# Patient Record
Sex: FEMALE | Race: WHITE | Employment: FULL TIME | URBAN - METROPOLITAN AREA
[De-identification: names, ages, dates, MRNs, and addresses within clinical notes are randomized per-mention and may not be internally consistent; named-entity substitution may affect disease eponyms.]

---

## 2020-11-04 ENCOUNTER — HOSPITAL ENCOUNTER (OUTPATIENT)
Dept: LAB | Facility: MEDICAL CENTER | Age: 50
End: 2020-11-04
Payer: COMMERCIAL

## 2020-11-05 LAB
COVID ORDER STATUS COVID19: NORMAL
SARS-COV-2 RNA RESP QL NAA+PROBE: NOTDETECTED
SPECIMEN SOURCE: NORMAL

## 2020-11-24 ENCOUNTER — HOSPITAL ENCOUNTER (OUTPATIENT)
Dept: LAB | Facility: MEDICAL CENTER | Age: 50
End: 2020-11-24
Attending: PATHOLOGY
Payer: COMMERCIAL

## 2020-11-24 LAB — COVID ORDER STATUS COVID19: NORMAL

## 2020-11-25 LAB
SARS-COV-2 RNA RESP QL NAA+PROBE: NOTDETECTED
SPECIMEN SOURCE: NORMAL

## 2021-01-29 ENCOUNTER — HOSPITAL ENCOUNTER (OUTPATIENT)
Dept: LAB | Facility: MEDICAL CENTER | Age: 51
End: 2021-01-29
Payer: COMMERCIAL

## 2021-01-29 LAB
COVID ORDER STATUS COVID19: NORMAL
SARS-COV-2 RNA RESP QL NAA+PROBE: DETECTED
SPECIMEN SOURCE: ABNORMAL

## 2021-01-29 PROCEDURE — U0003 INFECTIOUS AGENT DETECTION BY NUCLEIC ACID (DNA OR RNA); SEVERE ACUTE RESPIRATORY SYNDROME CORONAVIRUS 2 (SARS-COV-2) (CORONAVIRUS DISEASE [COVID-19]), AMPLIFIED PROBE TECHNIQUE, MAKING USE OF HIGH THROUGHPUT TECHNOLOGIES AS DESCRIBED BY CMS-2020-01-R: HCPCS

## 2021-01-29 PROCEDURE — U0005 INFEC AGEN DETEC AMPLI PROBE: HCPCS

## 2021-07-20 ENCOUNTER — TELEPHONE (OUTPATIENT)
Dept: SCHEDULING | Facility: IMAGING CENTER | Age: 51
End: 2021-07-20

## 2021-08-31 ENCOUNTER — OFFICE VISIT (OUTPATIENT)
Dept: MEDICAL GROUP | Facility: PHYSICIAN GROUP | Age: 51
End: 2021-08-31
Payer: COMMERCIAL

## 2021-08-31 VITALS
TEMPERATURE: 97 F | HEART RATE: 89 BPM | WEIGHT: 259 LBS | BODY MASS INDEX: 43.15 KG/M2 | OXYGEN SATURATION: 95 % | HEIGHT: 65 IN | DIASTOLIC BLOOD PRESSURE: 82 MMHG | SYSTOLIC BLOOD PRESSURE: 128 MMHG

## 2021-08-31 DIAGNOSIS — E13.9 DIABETES MELLITUS TYPE 1.5, MANAGED AS TYPE 1 (HCC): ICD-10-CM

## 2021-08-31 DIAGNOSIS — F50.2 BULIMIA NERVOSA, PURGING TYPE: ICD-10-CM

## 2021-08-31 PROCEDURE — 99204 OFFICE O/P NEW MOD 45 MIN: CPT | Performed by: PHYSICIAN ASSISTANT

## 2021-08-31 RX ORDER — FLUOXETINE HYDROCHLORIDE 20 MG/1
20 CAPSULE ORAL DAILY
COMMUNITY
End: 2021-08-31

## 2021-08-31 RX ORDER — INSULIN GLARGINE 100 [IU]/ML
60 INJECTION, SOLUTION SUBCUTANEOUS EVERY EVENING
Qty: 60 ML | Refills: 2 | Status: SHIPPED | OUTPATIENT
Start: 2021-08-31 | End: 2021-09-07 | Stop reason: SDUPTHER

## 2021-08-31 RX ORDER — FLUOXETINE 10 MG/1
20 TABLET, FILM COATED ORAL DAILY
Qty: 60 TABLET | Refills: 1 | Status: SHIPPED | OUTPATIENT
Start: 2021-08-31 | End: 2021-09-03 | Stop reason: SDUPTHER

## 2021-08-31 RX ORDER — TRAZODONE HYDROCHLORIDE 50 MG/1
50 TABLET ORAL NIGHTLY
COMMUNITY
End: 2021-08-31

## 2021-08-31 RX ORDER — SYRINGE W-NEEDLE,DISPOSAB,3 ML 25GX5/8"
SYRINGE, EMPTY DISPOSABLE MISCELLANEOUS
Qty: 500 EACH | Refills: 3 | Status: SHIPPED | OUTPATIENT
Start: 2021-08-31

## 2021-08-31 RX ORDER — INSULIN GLARGINE 100 [IU]/ML
60 INJECTION, SOLUTION SUBCUTANEOUS EVERY EVENING
COMMUNITY
End: 2021-08-31 | Stop reason: CLARIF

## 2021-08-31 RX ORDER — HYDROXYZINE HYDROCHLORIDE 25 MG/1
25 TABLET, FILM COATED ORAL 3 TIMES DAILY PRN
COMMUNITY
End: 2021-08-31

## 2021-08-31 ASSESSMENT — PATIENT HEALTH QUESTIONNAIRE - PHQ9
5. POOR APPETITE OR OVEREATING: 3 - NEARLY EVERY DAY
SUM OF ALL RESPONSES TO PHQ QUESTIONS 1-9: 21
CLINICAL INTERPRETATION OF PHQ2 SCORE: 6

## 2021-09-01 ENCOUNTER — TELEPHONE (OUTPATIENT)
Dept: MEDICAL GROUP | Facility: PHYSICIAN GROUP | Age: 51
End: 2021-09-01

## 2021-09-01 PROBLEM — F50.2 BULIMIA NERVOSA, PURGING TYPE: Status: ACTIVE | Noted: 2021-09-01

## 2021-09-01 PROBLEM — F50.20 BULIMIA NERVOSA, PURGING TYPE: Status: ACTIVE | Noted: 2021-09-01

## 2021-09-01 NOTE — TELEPHONE ENCOUNTER
DOCUMENTATION OF PAR STATUS:    1. Name of Medication & Dose: LAntus     2. Name of Prescription Coverage Company & phone #: [employer]    3. Date Prior Auth Submitted: 09/01/2021    4. What information was given to obtain insurance decision?    5. Prior Auth Status? Pending    6. Patient Notified: no

## 2021-09-01 NOTE — PROGRESS NOTES
Chief Complaint   Patient presents with   • Establish Care     from [facility]    • Diabetes     Type 1.5   • Medication Refill       HISTORY OF THE PRESENT ILLNESS: Cami Block is a 51 y.o. female new patient to our practice. This pleasant patient is here today to establish care and to discuss the evaluation and management of:    Patient is a pleasant 51-year-old female here today to establish care.  Patient tells me that she has type 1.5 diabetes and was diagnosed in 2009.  Patient relocated from [facility] 1 year ago and due to insurance changes she was unable to get NovoLog so instead substituted with Novolin R.  She tells me her current medication regimen is Novolin R 20 units 3 times daily before meals and Lantus 60 units at bedtime.  She tells me this regimen works well for her.  She recalls 1 year ago her hemoglobin A1c was around 7.2.  States this was done while she lived in [facility].  States for the past year she struggled with binge eating.  States her fasting glucoses range from 120-180.  States prior to the past year her fasting glucose used to be .  Patient tells me that she suffers from bulimia and was diagnosed at 12 years old.  Patient states in her 20s she went to individual and group therapy and found it beneficial.  States she was able to eat a healthy well-balanced diet up until the past 3 years.  She tells me the past 1 year symptoms have exacerbated.  States she has been eating daily and purges once a week.  Patient does not want to follow-up with a therapist at this time.  She would like to find out what her medical insurance covers before being referred.  States in the past Prozac 20 mg once daily worked well for her to help control excessive eating.  She would like to restart Prozac.  She denies experiencing side effects or complications from Prozac.  Patient is not exercising regularly at this time.  Patient would like to go back to taking NovoLog instead of Novolin R.  She tells me that NovoLog  "helps control her diabetes better and she would like a refill for Lantus.  Patient denies polyuria competency, poor wound healing, vision changes, peripheral neuropathy.  She denies homicidal or suicidal ideation.        Past Medical History:   Diagnosis Date   • Anxiety    • Depression    • Diabetes (HCC)    • Type 1 diabetes (HCC)        History reviewed. No pertinent surgical history.    Family Status   Relation Name Status   • Mo  Alive   • MGMo       Family History   Problem Relation Age of Onset   • Cancer Mother    • Lung Disease Mother    • Cancer Maternal Grandmother        Social History     Tobacco Use   • Smoking status: Never Smoker   • Smokeless tobacco: Never Used   Vaping Use   • Vaping Use: Never used   Substance Use Topics   • Alcohol use: Yes   • Drug use: Never       Allergies: Amoxicillin, Nexium, and Penicillins    Current Outpatient Medications Ordered in Epic   Medication Sig Dispense Refill   • fluoxetine (PROZAC) 10 MG tablet Take 2 Tablets by mouth every day. 60 Tablet 1   • insulin aspart (NOVOLOG) 100 UNIT/ML Solution Inject 20 Units under the skin 3 times a day before meals. 60 mL 2   • insulin glargine (LANTUS) 100 UNIT/ML Solution Inject 60 Units under the skin every evening. 60 mL 2   • Syringe/Needle, Disp, (SYRINGE 3CC/25GX1\") 25G X 1\" 3 ML Misc Use with Novolag 3 times a day and Lantus 2 times per day. 500 Each 3     No current Epic-ordered facility-administered medications on file.       Review of Systems   Constitutional: Negative for fever, chills, weight loss and malaise/fatigue.   HENT: Negative for ear pain, nosebleeds, congestion, sore throat and neck pain.    Eyes: Negative for blurred vision.   Respiratory: Negative for cough, sputum production, shortness of breath and wheezing.    Cardiovascular: Negative for chest pain, palpitations, orthopnea and leg swelling.   Gastrointestinal: Negative for heartburn, nausea, vomiting and abdominal pain.   Genitourinary: " "Negative for dysuria, urgency and frequency.   Musculoskeletal: Negative for myalgias, back pain and joint pain.   Skin: Negative for rash and itching.   Neurological: Negative for dizziness, tingling, tremors, sensory change, focal weakness and headaches.   Endo/Heme/Allergies: Does not bruise/bleed easily.   Psychiatric/Behavioral: Negative for memory loss.     All other systems reviewed and are negative except as in HPI.    Exam: /82 (BP Location: Left arm, Patient Position: Sitting, BP Cuff Size: Adult long)   Pulse 89   Temp 36.1 °C (97 °F) (Temporal)   Ht 1.651 m (5' 5\")   Wt 117 kg (259 lb)   SpO2 95%  Body mass index is 43.1 kg/m².  General: Normal appearing. No distress.  HEENT: Normocephalic. Eyes conjunctiva clear lids without ptosis, ears normal shape and contour.  Neck: Supple without JVD. Thyroid is not enlarged.  Pulmonary: Clear to ausculation.  Normal effort. No rales, ronchi, or wheezing.  Cardiovascular: Regular rate and rhythm without murmur.   Abdomen: Nondistended.   Neurologic: Grossly nonfocal  Skin: Warm and dry.  No obvious lesions.  Musculoskeletal: Normal gait. No extremity cyanosis, clubbing, or edema.  Psych: Normal mood and affect. Alert and oriented x3. Judgment and insight is normal.      Medical decision-making and discussion:  1. Diabetes mellitus type 1.5, managed as type 1 (HCC)  Chronic problem.  Unknown is stable.  Lab work has been ordered.  Patient has been for to endocrinology.  Patient will follow up in 3 weeks to discuss lab work results.  Advised patient to continue working on diet, exercise, and developing healthy coping mechanisms for stress anxiety.  Patient does not want to be referred to a nutritionist at this time.  Refilled NovoLog and Lantus for patient.  Advised patient to get an annual eye exam and discussed importance of getting annual eye exams.  Foot exam will be completed and follow-up appointment.    We will discuss care gaps during follow-up " "appointment.    - REFERRAL TO ENDOCRINOLOGY  - insulin aspart (NOVOLOG) 100 UNIT/ML Solution; Inject 20 Units under the skin 3 times a day before meals.  Dispense: 60 mL; Refill: 2  - insulin glargine (LANTUS) 100 UNIT/ML Solution; Inject 60 Units under the skin every evening.  Dispense: 60 mL; Refill: 2  - CBC WITH DIFFERENTIAL; Future  - Comp Metabolic Panel; Future  - HEMOGLOBIN A1C; Future  - MICROALB/CREAT RATIO RAND. UR  - Lipid Profile; Future  - Syringe/Needle, Disp, (SYRINGE 3CC/25GX1\") 25G X 1\" 3 ML Misc; Use with Novolag 3 times a day and Lantus 2 times per day.  Dispense: 500 Each; Refill: 3    2. Bulimia nervosa, purging type  Chronic problem.  Uncontrolled.   Patient does not want to be referred to nutritionist at this time.  She is unsure what her medical insurance covers and will reach out to her medical insurance to find out if nutritionist, therapy is a covered cost.  She would like to start Prozac again.  During today's appointment patient has been prescribed Prozac and advised to take one, 10 mg tablet once daily for 2 weeks and then increase to 20 mg once daily on week 3.  Advised patient she may experience side effects for 2+ weeks but side effect symptoms should subside and she should start feel the benefits of the medication around weeks 4-6.  Highly emphasized importance of healthy diet, regular exercise routine, practicing good sleep hygiene, staying hydrated, and developing healthy coping mechanisms for stress and anxiety.  Discussed emergency room precautions with patient.  Discussed black box warning of Prazac.      - fluoxetine (PROZAC) 10 MG tablet; Take 2 Tablets by mouth every day.  Dispense: 60 Tablet; Refill: 1  - CBC WITH DIFFERENTIAL; Future  - Comp Metabolic Panel; Future      Please note that this dictation was created using voice recognition software. I have made every reasonable attempt to correct obvious errors, but I expect that there are errors of grammar and possibly " "content that I did not discover before finalizing the note.      Assessment/Plan  1. Diabetes mellitus type 1.5, managed as type 1 (HCC)  REFERRAL TO ENDOCRINOLOGY    insulin aspart (NOVOLOG) 100 UNIT/ML Solution    insulin glargine (LANTUS) 100 UNIT/ML Solution    CBC WITH DIFFERENTIAL    Comp Metabolic Panel    HEMOGLOBIN A1C    MICROALB/CREAT RATIO RAND. UR    Lipid Profile    Syringe/Needle, Disp, (SYRINGE 3CC/25GX1\") 25G X 1\" 3 ML Misc   2. Bulimia nervosa, purging type  fluoxetine (PROZAC) 10 MG tablet    CBC WITH DIFFERENTIAL    Comp Metabolic Panel       Return in about 3 weeks (around 9/21/2021), or if symptoms worsen or fail to improve.  "

## 2021-09-01 NOTE — TELEPHONE ENCOUNTER
MEDICATION PRIOR AUTHORIZATION NEEDED:    1. Name of Medication: Lantus    2. Requested By (Name of Pharmacy): [facility]     3. Is insurance on file current?Jose Martin    4. What is the name & phone number of the 3rd party payor?

## 2021-09-03 DIAGNOSIS — E13.9 DIABETES MELLITUS TYPE 1.5, MANAGED AS TYPE 1 (HCC): ICD-10-CM

## 2021-09-03 DIAGNOSIS — F50.2 BULIMIA NERVOSA, PURGING TYPE: ICD-10-CM

## 2021-09-03 RX ORDER — FLUOXETINE 10 MG/1
20 TABLET, FILM COATED ORAL DAILY
Qty: 60 TABLET | Refills: 1 | Status: SHIPPED | OUTPATIENT
Start: 2021-09-03 | End: 2021-09-07 | Stop reason: SDUPTHER

## 2021-09-03 RX ORDER — INSULIN GLARGINE 100 [IU]/ML
60 INJECTION, SOLUTION SUBCUTANEOUS EVERY EVENING
Qty: 60 ML | Refills: 0 | OUTPATIENT
Start: 2021-09-03

## 2021-09-03 RX ORDER — FLUOXETINE 10 MG/1
20 TABLET, FILM COATED ORAL DAILY
Qty: 60 TABLET | Refills: 0 | Status: CANCELLED | OUTPATIENT
Start: 2021-09-03

## 2021-09-07 ENCOUNTER — PATIENT MESSAGE (OUTPATIENT)
Dept: MEDICAL GROUP | Facility: PHYSICIAN GROUP | Age: 51
End: 2021-09-07

## 2021-09-07 ENCOUNTER — TELEPHONE (OUTPATIENT)
Dept: MEDICAL GROUP | Facility: PHYSICIAN GROUP | Age: 51
End: 2021-09-07

## 2021-09-07 DIAGNOSIS — F50.2 BULIMIA NERVOSA, PURGING TYPE: ICD-10-CM

## 2021-09-07 DIAGNOSIS — E13.9 DIABETES MELLITUS TYPE 1.5, MANAGED AS TYPE 1 (HCC): ICD-10-CM

## 2021-09-07 RX ORDER — INSULIN GLARGINE 100 [IU]/ML
60 INJECTION, SOLUTION SUBCUTANEOUS EVERY EVENING
Qty: 60 ML | Refills: 0 | Status: SHIPPED | OUTPATIENT
Start: 2021-09-07

## 2021-09-07 RX ORDER — FLUOXETINE HYDROCHLORIDE 20 MG/1
20 CAPSULE ORAL DAILY
Qty: 90 CAPSULE | Refills: 3 | Status: SHIPPED | OUTPATIENT
Start: 2021-09-07 | End: 2021-09-09 | Stop reason: SDUPTHER

## 2021-09-07 RX ORDER — FLUOXETINE 10 MG/1
20 TABLET, FILM COATED ORAL DAILY
Qty: 180 TABLET | Refills: 0 | Status: SHIPPED | OUTPATIENT
Start: 2021-09-07 | End: 2021-09-07

## 2021-09-07 NOTE — TELEPHONE ENCOUNTER
Please resend rxs to [facility].    Received request via: Pharmacy    Was the patient seen in the last year in this department? Yes    Does the patient have an active prescription (recently filled or refills available) for medication(s) requested? No

## 2021-09-07 NOTE — TELEPHONE ENCOUNTER
MEDICATION PRIOR AUTHORIZATION NEEDED:    1. Name of Medication: Novolog 100 unit/ml    2. Requested By (Name of Pharmacy): [facility]     3. Is insurance on file current? Yes    4. What is the name & phone number of the 3rd party payor? Jose Martin

## 2021-09-07 NOTE — TELEPHONE ENCOUNTER
DOCUMENTATION OF PAR STATUS:    1. Name of Medication & Dose: Novolog     2. Name of Prescription Coverage Company & phone #: [employer]    3. Date Prior Auth Submitted: 9/7/2021    4. What information was given to obtain insurance decision? N/A    5. Prior Auth Status? DENIED    Provider notified

## 2021-09-07 NOTE — TELEPHONE ENCOUNTER
Phone Number Called: [phone] (home)     Call outcome: Did not leave a detailed message. Requested patient to call back.    Message: cb to see if patient needs another insulin sent

## 2021-09-08 ENCOUNTER — TELEPHONE (OUTPATIENT)
Dept: MEDICAL GROUP | Facility: PHYSICIAN GROUP | Age: 51
End: 2021-09-08

## 2021-09-08 NOTE — TELEPHONE ENCOUNTER
Phone Number Called: [phone]    Call outcome: Spoke w/ [facility] Reps    Message: Spoke with two different Representatives for [facility] mail [facility], and was unable to get confirmation in the HUMALOG Provider sent in on yesterday was received and able to be filled.  Or whether we would need the Provider to send in a new script for Prozac to be tablets and not capsules.  Asked me to have the Pt contact their INS to make sure they are the correct mail order pharmacy contracted with them.

## 2021-09-08 NOTE — TELEPHONE ENCOUNTER
[employer]  1. Caller Name: Cami Block                      Call Back Number: [phone] (home)     2. Message: Pt called, stated the INS is still rejecting the medication refills sent in on yesterday.  Stated they need the Insulin sent in as Humalog, not Novalog.  And the Prozac sent in as 20 mg Tablets not capsules.  Pt asked if PCP could resend these two in as requested by INS approve and cover for the mail order pharmacy to fill.

## 2021-09-08 NOTE — TELEPHONE ENCOUNTER
Phone Number Called: [phone] (home)     Call outcome: Left detailed message for patient. Informed to call back with any additional questions.    Message: LVM for Pt about message below.  Asked for a rtn call with the information needed.

## 2021-09-09 RX ORDER — FLUOXETINE HYDROCHLORIDE 20 MG/1
20 CAPSULE ORAL DAILY
Qty: 90 CAPSULE | Refills: 3 | Status: SHIPPED | OUTPATIENT
Start: 2021-09-09

## 2021-09-16 ENCOUNTER — TELEPHONE (OUTPATIENT)
Dept: MEDICAL GROUP | Facility: PHYSICIAN GROUP | Age: 51
End: 2021-09-16

## 2021-09-16 NOTE — TELEPHONE ENCOUNTER
Are you sure novolog? Humalog was sent by [facility] on 9/9/2021. Patient may have to call insurance to see which meal-time insulin they do cover.

## 2021-09-16 NOTE — TELEPHONE ENCOUNTER
Phone Number Called:   [facility]/pharmacy #[id number] - [place], [place] - [address] Phone:  [phone]          Call outcome: I spoke with Marisabel at [facility] and she confirmed Humalog was filled on 9/8/21 @ [facility].  Patient all set - not due for refill at this time.

## 2021-09-16 NOTE — TELEPHONE ENCOUNTER
I do believe it is the Novolog that is not covered - see previous message.  I can call pharmacy to see if they are able to fill the humalog if you would like.

## 2021-09-16 NOTE — TELEPHONE ENCOUNTER
Received a call from pts pharmacy.    Novolog is not covered by pts insurance, is there something else that can be sent.

## 2021-09-21 ENCOUNTER — HOSPITAL ENCOUNTER (OUTPATIENT)
Dept: LAB | Facility: MEDICAL CENTER | Age: 51
End: 2021-09-21
Attending: PHYSICIAN ASSISTANT
Payer: COMMERCIAL

## 2021-09-21 ENCOUNTER — APPOINTMENT (OUTPATIENT)
Dept: MEDICAL GROUP | Facility: PHYSICIAN GROUP | Age: 51
End: 2021-09-21
Payer: COMMERCIAL

## 2021-09-21 DIAGNOSIS — E13.9 DIABETES MELLITUS TYPE 1.5, MANAGED AS TYPE 1 (HCC): ICD-10-CM

## 2021-09-21 DIAGNOSIS — F50.2 BULIMIA NERVOSA, PURGING TYPE: ICD-10-CM

## 2021-09-21 LAB
ALBUMIN SERPL BCP-MCNC: 3.9 G/DL (ref 3.2–4.9)
ALBUMIN/GLOB SERPL: 1.2 G/DL
ALP SERPL-CCNC: 105 U/L (ref 30–99)
ALT SERPL-CCNC: 11 U/L (ref 2–50)
ANION GAP SERPL CALC-SCNC: 13 MMOL/L (ref 7–16)
AST SERPL-CCNC: 13 U/L (ref 12–45)
BASOPHILS # BLD AUTO: 0.6 % (ref 0–1.8)
BASOPHILS # BLD: 0.06 K/UL (ref 0–0.12)
BILIRUB SERPL-MCNC: 0.6 MG/DL (ref 0.1–1.5)
BUN SERPL-MCNC: 14 MG/DL (ref 8–22)
CALCIUM SERPL-MCNC: 9.4 MG/DL (ref 8.5–10.5)
CHLORIDE SERPL-SCNC: 97 MMOL/L (ref 96–112)
CHOLEST SERPL-MCNC: 255 MG/DL (ref 100–199)
CO2 SERPL-SCNC: 24 MMOL/L (ref 20–33)
CREAT SERPL-MCNC: 0.64 MG/DL (ref 0.5–1.4)
CREAT UR-MCNC: 248.31 MG/DL
EOSINOPHIL # BLD AUTO: 0.2 K/UL (ref 0–0.51)
EOSINOPHIL NFR BLD: 2 % (ref 0–6.9)
ERYTHROCYTE [DISTWIDTH] IN BLOOD BY AUTOMATED COUNT: 42.1 FL (ref 35.9–50)
EST. AVERAGE GLUCOSE BLD GHB EST-MCNC: 240 MG/DL
FASTING STATUS PATIENT QL REPORTED: NORMAL
GLOBULIN SER CALC-MCNC: 3.3 G/DL (ref 1.9–3.5)
GLUCOSE SERPL-MCNC: 305 MG/DL (ref 65–99)
HBA1C MFR BLD: 10 % (ref 4–5.6)
HCT VFR BLD AUTO: 48.4 % (ref 37–47)
HDLC SERPL-MCNC: 48 MG/DL
HGB BLD-MCNC: 16.1 G/DL (ref 12–16)
IMM GRANULOCYTES # BLD AUTO: 0.05 K/UL (ref 0–0.11)
IMM GRANULOCYTES NFR BLD AUTO: 0.5 % (ref 0–0.9)
LDLC SERPL CALC-MCNC: 163 MG/DL
LYMPHOCYTES # BLD AUTO: 1.95 K/UL (ref 1–4.8)
LYMPHOCYTES NFR BLD: 19.1 % (ref 22–41)
MCH RBC QN AUTO: 29.9 PG (ref 27–33)
MCHC RBC AUTO-ENTMCNC: 33.3 G/DL (ref 33.6–35)
MCV RBC AUTO: 90 FL (ref 81.4–97.8)
MICROALBUMIN UR-MCNC: 2.8 MG/DL
MICROALBUMIN/CREAT UR: 11 MG/G (ref 0–30)
MONOCYTES # BLD AUTO: 0.7 K/UL (ref 0–0.85)
MONOCYTES NFR BLD AUTO: 6.9 % (ref 0–13.4)
NEUTROPHILS # BLD AUTO: 7.23 K/UL (ref 2–7.15)
NEUTROPHILS NFR BLD: 70.9 % (ref 44–72)
NRBC # BLD AUTO: 0 K/UL
NRBC BLD-RTO: 0 /100 WBC
PLATELET # BLD AUTO: 283 K/UL (ref 164–446)
PMV BLD AUTO: 10.9 FL (ref 9–12.9)
POTASSIUM SERPL-SCNC: 4.4 MMOL/L (ref 3.6–5.5)
PROT SERPL-MCNC: 7.2 G/DL (ref 6–8.2)
RBC # BLD AUTO: 5.38 M/UL (ref 4.2–5.4)
SODIUM SERPL-SCNC: 134 MMOL/L (ref 135–145)
TRIGL SERPL-MCNC: 221 MG/DL (ref 0–149)
WBC # BLD AUTO: 10.2 K/UL (ref 4.8–10.8)

## 2021-09-21 PROCEDURE — 83036 HEMOGLOBIN GLYCOSYLATED A1C: CPT

## 2021-09-21 PROCEDURE — 82043 UR ALBUMIN QUANTITATIVE: CPT

## 2021-09-21 PROCEDURE — 82570 ASSAY OF URINE CREATININE: CPT

## 2021-09-21 PROCEDURE — 80053 COMPREHEN METABOLIC PANEL: CPT

## 2021-09-21 PROCEDURE — 80061 LIPID PANEL: CPT

## 2021-09-21 PROCEDURE — 85025 COMPLETE CBC W/AUTO DIFF WBC: CPT

## 2021-09-21 PROCEDURE — 36415 COLL VENOUS BLD VENIPUNCTURE: CPT

## 2021-10-06 ENCOUNTER — TELEMEDICINE (OUTPATIENT)
Dept: MEDICAL GROUP | Facility: PHYSICIAN GROUP | Age: 51
End: 2021-10-06
Payer: COMMERCIAL

## 2021-10-06 VITALS — TEMPERATURE: 98.5 F | RESPIRATION RATE: 16 BRPM | HEIGHT: 65 IN | WEIGHT: 253.5 LBS | BODY MASS INDEX: 42.24 KG/M2

## 2021-10-06 DIAGNOSIS — R40.0 DAYTIME SOMNOLENCE: ICD-10-CM

## 2021-10-06 DIAGNOSIS — E13.9 DIABETES MELLITUS TYPE 1.5, MANAGED AS TYPE 1 (HCC): ICD-10-CM

## 2021-10-06 DIAGNOSIS — R06.83 SNORING: ICD-10-CM

## 2021-10-06 DIAGNOSIS — Z12.31 ENCOUNTER FOR SCREENING MAMMOGRAM FOR BREAST CANCER: ICD-10-CM

## 2021-10-06 DIAGNOSIS — F50.2 BULIMIA NERVOSA, PURGING TYPE: ICD-10-CM

## 2021-10-06 DIAGNOSIS — E78.2 MIXED HYPERLIPIDEMIA: ICD-10-CM

## 2021-10-06 PROCEDURE — 99214 OFFICE O/P EST MOD 30 MIN: CPT | Mod: 95 | Performed by: PHYSICIAN ASSISTANT

## 2021-10-06 RX ORDER — ATORVASTATIN CALCIUM 20 MG/1
20 TABLET, FILM COATED ORAL EVERY EVENING
Qty: 90 TABLET | Refills: 3 | Status: SHIPPED | OUTPATIENT
Start: 2021-10-06

## 2021-10-06 ASSESSMENT — FIBROSIS 4 INDEX: FIB4 SCORE: 0.71

## 2021-10-06 NOTE — PROGRESS NOTES
Virtual Visit: Established Patient   This visit was conducted via Zoom using secure and encrypted videoconferencing technology.   The patient was in a private location in the state of [place].    The patient's identity was confirmed and verbal consent was obtained for this virtual visit.    Subjective:   CC: Diabetes, lab work results, medication management.  Chief Complaint   Patient presents with   • Follow-Up     medication review, lab review       Cami Block is a 51 y.o. female presenting for evaluation and management of:    Diabetes mellitus type 1.5, managed as type 1 (HCC)  Chronic problem.  Uncontrolled.  Patient's hemoglobin A1c 10.0 on 9/21/2021.  Patient establish me on 8/31/2021 and at that time she had been rationing her Lantus and NovoLog due to not being able to get in with a provider and establish care.  During her last appointment NovoLog and Lantus was refilled but patient states NovoLog was not covered and now she is using Humalog instead.  Patient is currently taking 60 units of Lantus at bedtime and 20 units of NovoLog 3 times a day.  States she is been working on her diet and exercise.  Patient states she has been jumping on a mini trampoline for the past 5 days 15 minutes per time and plans on starting walking.  Last eye exam was in May 2020.  She denies polyuria, polydipsia, polyphagia, poor wound healing, vision changes, peripheral neuropathy.    Mixed hyperlipidemia  Chronic problem.  Uncontrolled.  New diagnosis.  Discussed recent lipid profile lab work results with patient.  Total cholesterol, triglycerides, bad cholesterol were elevated.  Patient is agreed to start a statin medication.  For the past 1 year patient has been binge eating and occasionally purging.  She has a positive past medical history for bulimia.  States since her last appointment she has been working on her diet and is no longer purging.  She recently started exercising regularly.    Bulimia nervosa, purging  "type  Chronic problem.  Improving.  During her last appointment patient states for the past 1 year she has been binge eating and occasionally purging.  Patient has been struggling with bulimia since 12 years old.  During her last appointment she did request to be placed back on Prozac because in the past this worked well for her.  Patient has been taking Prozac 20 mg once daily since our last appointment and states she is feeling much better and instead of binge eating daily on average she may binge eat once per week and she is no longer purging.  Patient would like to continue Prozac as a treatment option.    Snoring  Daytime somnolence  Reviewed recent CBC results.  Hematocrit and hemoglobin were just slightly elevated.  MCHC was slightly low.  Patient admits that she was not hydrated before completing lab work.  She also tells me that she severely snores and occasionally wakes up from snoring and catches her breath.  States she has to sleep with her head elevated with 2 pillows and finds that she gets better sleep when doing this.  She admits to daytime somnolence.  Patient has agreed to do a sleep study.    ROS       Current medicines (including changes today)  Current Outpatient Medications   Medication Sig Dispense Refill   • atorvastatin (LIPITOR) 20 MG Tab Take 1 Tablet by mouth every evening. 90 Tablet 3   • FLUoxetine (PROZAC) 20 MG Cap Take 1 Capsule by mouth every day. 90 Capsule 3   • insulin lispro (HUMALOG) 100 UNIT/ML Inject 20 Units under the skin 3 times a day before meals for 90 days. 54 mL 0   • insulin glargine (LANTUS) 100 UNIT/ML Solution Inject 60 Units under the skin every evening. 60 mL 0   • Syringe/Needle, Disp, (SYRINGE 3CC/25GX1\") 25G X 1\" 3 ML Misc Use with Novolag 3 times a day and Lantus 2 times per day. 500 Each 3     No current facility-administered medications for this visit.       Patient Active Problem List    Diagnosis Date Noted   • Bulimia nervosa, purging type 09/01/2021   • " "Diabetes mellitus type 1.5, managed as type 1 ([facility]) 08/31/2021        Objective:   Temp 36.9 °C (98.5 °F)   Resp 16   Ht 1.651 m (5' 5\")   Wt 115 kg (253 lb 8 oz)   BMI 42.18 kg/m²     Physical Exam:  Constitutional: Alert, no distress, well-groomed.  Skin: No rashes in visible areas.  Eye: Round. Conjunctiva clear, lids normal. No icterus.   ENMT: Lips pink without lesions, good dentition, moist mucous membranes. Phonation normal.  Neck: No masses, no thyromegaly. Moves freely without pain.  Respiratory: Unlabored respiratory effort, no cough or audible wheeze  Psych: Alert and oriented x3, normal affect and mood.     Assessment and Plan:   The following treatment plan was discussed:     1. Diabetes mellitus type 1.5, managed as type 1 ([facility])  Diabetes currently controlled.   Patient's hemoglobin A1c 10.0 on 9/21/2021.  Continue Humalog and Lantus.  Highly incised importance of establishing with endocrinology.  Patient states she is having difficulty getting hold of the endocrinology department but will continue trying.  Patient is not on ACE inhibitor or does not take aspirin.  She is agreed to start a statin medication.  Diabetic foot exam will be completed in a future appointment.  Eye exam has not been completed since May 2020.  Advised patient to make sure she is getting annual eye exams.  Highly incised importance of healthy diet regular exercise routine.  Continue to monitor.    Follow-up for worsening symptoms,lack of expected recovery, or should new symptoms or problems arise.    - atorvastatin (LIPITOR) 20 MG Tab; Take 1 Tablet by mouth every evening.  Dispense: 90 Tablet; Refill: 3    2. Mixed hyperlipidemia  Chronic problem.  Uncontrolled.  New diagnosis.  Discussed recent lipid profile lab results with patient.  Lipitor 20 mg tab once daily has been prescribed.  Discussed side effects of medication with patient.  Advised patient if she experiences muscle aches she can discontinue Lipitor and take " over-the-counter co-Q10 for 2-3 weeks and then restart Lipitor but continue co-Q10 when taking Lipitor.  If she still experiences muscle aches to make a follow-up appointment to discuss alternative treatment options.  Advised patient continue working on diet and exercise.  - atorvastatin (LIPITOR) 20 MG Tab; Take 1 Tablet by mouth every evening.  Dispense: 90 Tablet; Refill: 3    3. Bulimia nervosa, purging type  Chronic problem.  Improving.  Continue Prozac.  Continue working on diet, exercise, sleep hygiene, developing healthy coping mechanisms for stress anxiety.  Continue to monitor.    Follow-up for worsening symptoms,lack of expected recovery, or should new symptoms or problems arise.      4. Snoring  5. Daytime somnolence  Chronic problem.  Uncontrolled.  Advised patient to work on diet, exercise, and normalizing body weight.  Patient has been referred to sleep medicine for sleep study for further evaluation of snoring and daytime somnolence.      - REFERRAL TO PULMONARY AND SLEEP MEDICINE    6. Encounter for screening mammogram for breast cancer  - MA-SCREENING MAMMO BILAT W/TOMOSYNTHESIS W/CAD; Future      Follow-up: Return in about 3 months (around 1/6/2022).

## 2021-11-11 ENCOUNTER — TELEPHONE (OUTPATIENT)
Dept: MEDICAL GROUP | Facility: PHYSICIAN GROUP | Age: 51
End: 2021-11-11

## 2021-11-11 NOTE — TELEPHONE ENCOUNTER
Phone Number Called: [phone]    Call outcome: Left detailed message for patient. Informed to call back with any additional questions.    Message: Please call Nurse Nolasco to get you set up with new primary care provider, follow-up will be needed to make sure your diabetes is being well managed.

## 2024-04-23 SDOH — HEALTH STABILITY: PHYSICAL HEALTH: ON AVERAGE, HOW MANY DAYS PER WEEK DO YOU ENGAGE IN MODERATE TO STRENUOUS EXERCISE (LIKE A BRISK WALK)?: 0 DAYS

## 2024-04-23 SDOH — ECONOMIC STABILITY: FOOD INSECURITY: WITHIN THE PAST 12 MONTHS, THE FOOD YOU BOUGHT JUST DIDN'T LAST AND YOU DIDN'T HAVE MONEY TO GET MORE.: NEVER TRUE

## 2024-04-23 SDOH — ECONOMIC STABILITY: INCOME INSECURITY: HOW HARD IS IT FOR YOU TO PAY FOR THE VERY BASICS LIKE FOOD, HOUSING, MEDICAL CARE, AND HEATING?: NOT VERY HARD

## 2024-04-23 SDOH — ECONOMIC STABILITY: HOUSING INSECURITY: IN THE LAST 12 MONTHS, HOW MANY PLACES HAVE YOU LIVED?: 1

## 2024-04-23 SDOH — ECONOMIC STABILITY: FOOD INSECURITY: WITHIN THE PAST 12 MONTHS, YOU WORRIED THAT YOUR FOOD WOULD RUN OUT BEFORE YOU GOT MONEY TO BUY MORE.: NEVER TRUE

## 2024-04-23 SDOH — ECONOMIC STABILITY: INCOME INSECURITY: IN THE LAST 12 MONTHS, WAS THERE A TIME WHEN YOU WERE NOT ABLE TO PAY THE MORTGAGE OR RENT ON TIME?: NO

## 2024-04-23 SDOH — ECONOMIC STABILITY: HOUSING INSECURITY
IN THE LAST 12 MONTHS, WAS THERE A TIME WHEN YOU DID NOT HAVE A STEADY PLACE TO SLEEP OR SLEPT IN A SHELTER (INCLUDING NOW)?: NO

## 2024-04-23 SDOH — ECONOMIC STABILITY: TRANSPORTATION INSECURITY
IN THE PAST 12 MONTHS, HAS THE LACK OF TRANSPORTATION KEPT YOU FROM MEDICAL APPOINTMENTS OR FROM GETTING MEDICATIONS?: NO

## 2024-04-23 SDOH — HEALTH STABILITY: PHYSICAL HEALTH: ON AVERAGE, HOW MANY MINUTES DO YOU ENGAGE IN EXERCISE AT THIS LEVEL?: 0 MIN

## 2024-04-23 SDOH — ECONOMIC STABILITY: TRANSPORTATION INSECURITY
IN THE PAST 12 MONTHS, HAS LACK OF RELIABLE TRANSPORTATION KEPT YOU FROM MEDICAL APPOINTMENTS, MEETINGS, WORK OR FROM GETTING THINGS NEEDED FOR DAILY LIVING?: NO

## 2024-04-23 SDOH — ECONOMIC STABILITY: TRANSPORTATION INSECURITY
IN THE PAST 12 MONTHS, HAS LACK OF TRANSPORTATION KEPT YOU FROM MEETINGS, WORK, OR FROM GETTING THINGS NEEDED FOR DAILY LIVING?: NO

## 2024-04-23 SDOH — HEALTH STABILITY: MENTAL HEALTH
STRESS IS WHEN SOMEONE FEELS TENSE, NERVOUS, ANXIOUS, OR CAN'T SLEEP AT NIGHT BECAUSE THEIR MIND IS TROUBLED. HOW STRESSED ARE YOU?: VERY MUCH

## 2024-04-23 ASSESSMENT — SOCIAL DETERMINANTS OF HEALTH (SDOH)
HOW OFTEN DO YOU GET TOGETHER WITH FRIENDS OR RELATIVES?: ONCE A WEEK
HOW OFTEN DO YOU ATTENT MEETINGS OF THE CLUB OR ORGANIZATION YOU BELONG TO?: NEVER
HOW MANY DRINKS CONTAINING ALCOHOL DO YOU HAVE ON A TYPICAL DAY WHEN YOU ARE DRINKING: 1 OR 2
HOW OFTEN DO YOU HAVE SIX OR MORE DRINKS ON ONE OCCASION: NEVER
HOW OFTEN DO YOU GET TOGETHER WITH FRIENDS OR RELATIVES?: ONCE A WEEK
WITHIN THE PAST 12 MONTHS, YOU WORRIED THAT YOUR FOOD WOULD RUN OUT BEFORE YOU GOT THE MONEY TO BUY MORE: NEVER TRUE
HOW OFTEN DO YOU ATTEND CHURCH OR RELIGIOUS SERVICES?: NEVER
HOW OFTEN DO YOU ATTENT MEETINGS OF THE CLUB OR ORGANIZATION YOU BELONG TO?: NEVER
IN A TYPICAL WEEK, HOW MANY TIMES DO YOU TALK ON THE PHONE WITH FAMILY, FRIENDS, OR NEIGHBORS?: MORE THAN THREE TIMES A WEEK
DO YOU BELONG TO ANY CLUBS OR ORGANIZATIONS SUCH AS CHURCH GROUPS UNIONS, FRATERNAL OR ATHLETIC GROUPS, OR SCHOOL GROUPS?: NO
HOW HARD IS IT FOR YOU TO PAY FOR THE VERY BASICS LIKE FOOD, HOUSING, MEDICAL CARE, AND HEATING?: NOT VERY HARD
IN A TYPICAL WEEK, HOW MANY TIMES DO YOU TALK ON THE PHONE WITH FAMILY, FRIENDS, OR NEIGHBORS?: MORE THAN THREE TIMES A WEEK
HOW OFTEN DO YOU HAVE A DRINK CONTAINING ALCOHOL: MONTHLY OR LESS
DO YOU BELONG TO ANY CLUBS OR ORGANIZATIONS SUCH AS CHURCH GROUPS UNIONS, FRATERNAL OR ATHLETIC GROUPS, OR SCHOOL GROUPS?: NO
HOW OFTEN DO YOU ATTEND CHURCH OR RELIGIOUS SERVICES?: NEVER

## 2024-04-23 ASSESSMENT — LIFESTYLE VARIABLES
AUDIT-C TOTAL SCORE: 1
HOW OFTEN DO YOU HAVE A DRINK CONTAINING ALCOHOL: MONTHLY OR LESS
HOW MANY STANDARD DRINKS CONTAINING ALCOHOL DO YOU HAVE ON A TYPICAL DAY: 1 OR 2
HOW OFTEN DO YOU HAVE SIX OR MORE DRINKS ON ONE OCCASION: NEVER
SKIP TO QUESTIONS 9-10: 1

## 2024-04-24 ENCOUNTER — OFFICE VISIT (OUTPATIENT)
Dept: MEDICAL GROUP | Facility: PHYSICIAN GROUP | Age: 54
End: 2024-04-24
Payer: COMMERCIAL

## 2024-04-24 VITALS
BODY MASS INDEX: 41.04 KG/M2 | HEIGHT: 64 IN | SYSTOLIC BLOOD PRESSURE: 116 MMHG | TEMPERATURE: 96.8 F | WEIGHT: 240.4 LBS | DIASTOLIC BLOOD PRESSURE: 78 MMHG | OXYGEN SATURATION: 95 % | HEART RATE: 73 BPM

## 2024-04-24 DIAGNOSIS — E13.9 DIABETES MELLITUS TYPE 1.5, MANAGED AS TYPE 1 (HCC): Primary | ICD-10-CM

## 2024-04-24 LAB
HBA1C MFR BLD: 11.6 % (ref ?–5.8)
POCT INT CON NEG: NEGATIVE
POCT INT CON POS: POSITIVE

## 2024-04-24 PROCEDURE — 3074F SYST BP LT 130 MM HG: CPT | Performed by: NURSE PRACTITIONER

## 2024-04-24 PROCEDURE — 99214 OFFICE O/P EST MOD 30 MIN: CPT | Performed by: NURSE PRACTITIONER

## 2024-04-24 PROCEDURE — 3078F DIAST BP <80 MM HG: CPT | Performed by: NURSE PRACTITIONER

## 2024-04-24 PROCEDURE — 83036 HEMOGLOBIN GLYCOSYLATED A1C: CPT | Performed by: NURSE PRACTITIONER

## 2024-04-24 RX ORDER — INSULIN GLARGINE 100 [IU]/ML
20 INJECTION, SOLUTION SUBCUTANEOUS EVERY EVENING
Qty: 12 ML | Refills: 2 | Status: SHIPPED | OUTPATIENT
Start: 2024-04-24 | End: 2024-04-24

## 2024-04-24 RX ORDER — LANCETS 30 GAUGE
EACH MISCELLANEOUS
Qty: 200 EACH | Refills: 0 | Status: SHIPPED | OUTPATIENT
Start: 2024-04-24

## 2024-04-24 RX ORDER — INSULIN ASPART 100 [IU]/ML
10 INJECTION, SOLUTION INTRAVENOUS; SUBCUTANEOUS
Qty: 9 ML | Refills: 3 | Status: SHIPPED | OUTPATIENT
Start: 2024-04-24

## 2024-04-24 RX ORDER — INSULIN GLARGINE 100 [IU]/ML
15 INJECTION, SOLUTION SUBCUTANEOUS EVERY EVENING
Qty: 12 ML | Refills: 2 | Status: SHIPPED | OUTPATIENT
Start: 2024-04-24

## 2024-04-24 RX ORDER — GLUCOSAMINE HCL/CHONDROITIN SU 500-400 MG
CAPSULE ORAL
Qty: 200 EACH | Refills: 0 | Status: SHIPPED | OUTPATIENT
Start: 2024-04-24

## 2024-04-24 RX ORDER — PROPRANOLOL HYDROCHLORIDE 10 MG/1
10 TABLET ORAL 3 TIMES DAILY PRN
COMMUNITY
Start: 2024-03-20

## 2024-04-24 ASSESSMENT — ENCOUNTER SYMPTOMS
WEIGHT LOSS: 0
HEADACHES: 0
FEVER: 0
SHORTNESS OF BREATH: 0
CHILLS: 0
DIZZINESS: 0

## 2024-04-24 ASSESSMENT — PATIENT HEALTH QUESTIONNAIRE - PHQ9: CLINICAL INTERPRETATION OF PHQ2 SCORE: 0

## 2024-04-24 NOTE — PROGRESS NOTES
Subjective:     CC: Establish care, diabetes    HPI:   Cami is a 53 y.o. female presents to establish care. Previous PCP was Amber Blanco PA-C. Specialists: Psychiatry. Pt would like to discuss the following today:    Problem   Diabetes Mellitus Type 1.5, Managed As Type 1 ([facility])    In 2009, she was diagnosed as type 1.5 diabetes mellitus. She was told that her pancreas does not produce much insulin. She was previously on both basal and prandial insulin and was previously well controlled. She has been out of insulin for about 2-3 years. She has been using OTC insulin. She does endorse some episodes of polydipsia and polyuria. Denies episodes of unintentional weight loss.     Last A1C: 11.6% (04/24/24)  Last Microalb/Cr ratio: ordered with labs  Fasting sugars: 150-225  Last diabetic foot exam: 04/24/2024  Last retinal eye exam:  Statin: was previously on atorvastatin 20mg and will plan to resume once we obtain baseline lipid panel   Aspirin: none  Concomitant HTN: n/a  Tobacco: No  Nightly foot checks? Yes        Health Maintenance/Immunizations: Completed    Current Outpatient Medications   Medication Sig Dispense Refill    sertraline (ZOLOFT) 50 MG Tab Take 50 mg by mouth every day.      propranolol (INDERAL) 10 MG Tab Take 10 mg by mouth 3 times a day as needed.      Blood Glucose Meter Kit Test blood sugar as recommended by provider.  blood glucose monitoring kit. 1 Kit 0    Blood Glucose Test Strips Use one strip to test blood sugar three times daily. 200 Strip 0    Lancets Use one lancet to test blood sugar three times daily. 200 Each 0    Alcohol Swabs Wipe site with prep pad prior to injection. 200 Each 0    Insulin Syringes U-100 0.3 mL Use one syringe to inject insulin four times daily. 200 Each 0    insulin aspart (NOVOLOG FLEXPEN) 100 UNIT/ML injection PEN Inject 10 Units under the skin 3 times a day before meals. 9 mL 3    insulin glargine 100 UNIT/ML Solution Pen-injector injection Inject 15 Units under  "the skin every evening. 12 mL 2    Syringe/Needle, Disp, (SYRINGE 3CC/25GX1\") 25G X 1\" 3 ML Misc Use with Novolag 3 times a day and Lantus 2 times per day. 500 Each 3    atorvastatin (LIPITOR) 20 MG Tab Take 1 Tablet by mouth every evening. (Patient not taking: Reported on 4/24/2024) 90 Tablet 3     No current facility-administered medications for this visit.     Past Medical History:   Diagnosis Date    Anxiety     Depression     Diabetes (HCC)     Type 1 diabetes (HCC)      History reviewed. No pertinent surgical history.     Family History   Problem Relation Age of Onset    Cancer Mother     Lung Disease Mother     Cancer Maternal Grandmother      Social History     Tobacco Use    Smoking status: Never    Smokeless tobacco: Never   Vaping Use    Vaping Use: Never used   Substance Use Topics    Alcohol use: Yes     Comment: occ    Drug use: Yes     Types: Marijuana     Comment: gummies      Review of Systems   Constitutional:  Negative for chills, fever and weight loss.   Respiratory:  Negative for shortness of breath.    Cardiovascular:  Negative for chest pain.   Neurological:  Negative for dizziness and headaches.        Objective:     /78 (BP Location: Left arm, Patient Position: Sitting, BP Cuff Size: Adult)   Pulse 73   Temp 36 °C (96.8 °F) (Temporal)   Ht 1.633 m (5' 4.3\")   Wt 109 kg (240 lb 6.4 oz)   SpO2 95%   BMI 40.88 kg/m²  Body mass index is 40.88 kg/m².     Wt Readings from Last 4 Encounters:   04/24/24 109 kg (240 lb 6.4 oz)   10/06/21 115 kg (253 lb 8 oz)   08/31/21 117 kg (259 lb)     Physical Exam  Constitutional:       Appearance: Normal appearance.   Eyes:      Conjunctiva/sclera: Conjunctivae normal.      Pupils: Pupils are equal, round, and reactive to light.   Cardiovascular:      Rate and Rhythm: Normal rate and regular rhythm.      Heart sounds: Normal heart sounds. No murmur heard.  Pulmonary:      Effort: Pulmonary effort is normal. No respiratory distress.      Breath " sounds: Normal breath sounds.   Musculoskeletal:      Right lower leg: No edema.      Left lower leg: No edema.   Skin:     General: Skin is warm and dry.   Neurological:      General: No focal deficit present.      Mental Status: She is alert and oriented to person, place, and time.   Psychiatric:         Mood and Affect: Mood normal.         Behavior: Behavior normal.        Lab Results   Component Value Date/Time    HBA1C 11.6 (A) 04/24/2024 04:12 PM    HBA1C 10.0 (H) 09/21/2021 06:29 AM      Assessment and Plan:   53 y.o. female with the following -    1. Diabetes mellitus type 1.5, managed as type 1 (HCC)  Chronic, uncontrolled. She has been out of care for over 2 years. Her A1C today is at 11.6%. I will resume both basal and prandial insulin. She has not used a sliding scale for prandial insulin, but she was previously on 20 units. I will begin at 10 units, to avoid episodes of hypoglycemia and will have her follow up with clinical pharmacy in 2 weeks for close monitoring. A referral has also been initiated to endocrinology. She verbalized understanding.   - POCT Hemoglobin A1C  - Referral to Endocrinology  - Blood Glucose Meter Kit; Test blood sugar as recommended by provider.  blood glucose monitoring kit.  Dispense: 1 Kit; Refill: 0  - Blood Glucose Test Strips; Use one strip to test blood sugar three times daily.  Dispense: 200 Strip; Refill: 0  - Lancets; Use one lancet to test blood sugar three times daily.  Dispense: 200 Each; Refill: 0  - Alcohol Swabs; Wipe site with prep pad prior to injection.  Dispense: 200 Each; Refill: 0  - Insulin Syringes U-100 0.3 mL; Use one syringe to inject insulin four times daily.  Dispense: 200 Each; Refill: 0  - insulin aspart (NOVOLOG FLEXPEN) 100 UNIT/ML injection PEN; Inject 10 Units under the skin 3 times a day before meals.  Dispense: 9 mL; Refill: 3  - insulin glargine 100 UNIT/ML Solution Pen-injector injection; Inject 15 Units under the skin every evening.   Dispense: 12 mL; Refill: 2  - Referral to Pharmacotherapy Service  - Diabetic Monofilament LE Exam  - Comp Metabolic Panel; Future  - Lipid Profile; Future  - MICROALBUMIN CREAT RATIO URINE; Future  - CBC WITH DIFFERENTIAL; Future  - TSH WITH REFLEX TO FT4; Future    Return for 2 weeks with clinical pharmacy, 4-6 weeks to establish care .    Please note that this dictation was created using voice recognition software. I have made every reasonable attempt to correct obvious errors, but I expect that there are errors of grammar and possibly content that I did not discover before finalizing the note.

## 2024-04-25 DIAGNOSIS — E13.3211: ICD-10-CM

## 2024-05-01 ENCOUNTER — DOCUMENTATION (OUTPATIENT)
Dept: HEALTH INFORMATION MANAGEMENT | Facility: OTHER | Age: 54
End: 2024-05-01
Payer: COMMERCIAL

## 2024-05-06 ENCOUNTER — OFFICE VISIT (OUTPATIENT)
Dept: MEDICAL GROUP | Facility: PHYSICIAN GROUP | Age: 54
End: 2024-05-06
Payer: COMMERCIAL

## 2024-05-06 VITALS — BODY MASS INDEX: 40.97 KG/M2 | HEIGHT: 64 IN | HEART RATE: 76 BPM | WEIGHT: 240 LBS | OXYGEN SATURATION: 97 %

## 2024-05-06 DIAGNOSIS — E13.9 DIABETES MELLITUS TYPE 1.5, MANAGED AS TYPE 1 (HCC): ICD-10-CM

## 2024-05-06 PROCEDURE — 99402 PREV MED CNSL INDIV APPRX 30: CPT

## 2024-05-06 RX ORDER — BLOOD-GLUCOSE SENSOR
EACH MISCELLANEOUS
COMMUNITY

## 2024-05-06 NOTE — PATIENT INSTRUCTIONS
Long-acting insulin:   Adjust dose according to FASTING BLOOD GLUCOSE target 100-130 mg/dL  (1) Increase the insulin dose every 3-4 days as needed.   (2) Increase by 2 units if FBG average concentration is 131-170 mg/dL.   (3) Increase by 4 units if FBG average concentration is 171-210 mg/dL.   (4) Increase by 6 units if FBG average concentration is 221-260 mg/dL.   (5) Increase by 8 units if FBG average concentration is greater than 261mg/dL and call us.   Consider cutting back by 1-2 units to previous dose if glucose concentration is below 100 mg/dL or symptoms of hypoglycemia.       Rapid-acting insulin   Glucose reading  Change to be made to insulin dose    < 80 mg/dL  Subtract 1 unit     mg/dL  Usual premeal dose   (fixed or based on carbohydrate intake)    126-170 mg/dL  Add 1 unit    171-215 mg/dL  Add 2 units    216-260 mg/dL  Add 3 units       Varun Valerio, PharmD, MS, BCACP, LCC  [facility]  Phone: [phone]  Fax: [phone]  On call: [phone]  General scheduling/information [phone]  For emergencies please dial 911  Please do not use [employer] for urgent matters, call the phone numbers listed above.  [employer] messages are answered Monday through Friday.     This note was created using voice recognition software (Dragon). The accuracy of the dictation is limited by the abilities of the software. I have reviewed the note prior to signing, however some errors in grammar and context are still possible. If you have any questions related to this note please do not hesitate to contact our office.

## 2024-05-06 NOTE — PROGRESS NOTES
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 old patient who comes in today for evaluation of above stated problem.    Allergies:  Amoxicillin, Nexium, and Penicillins    Physical Examination:  Vital signs: There were no vitals taken for this visit. There is no height or weight on file to calculate BMI.    Most Recent labs, A1c, and immunizations:    Lab Results   Component Value Date/Time    HBA1C 11.6 (A) 04/24/2024 1612    HBA1C 10.0 (H) 09/21/2021 0629    AVGLUC 240 09/21/2021 0629         Lab Results   Component Value Date/Time    CHOLSTRLTOT 255 (H) 09/21/2021 06:29 AM     (H) 09/21/2021 06:29 AM    HDL 48 09/21/2021 06:29 AM    TRIGLYCERIDE 221 (H) 09/21/2021 06:29 AM       Lab Results   Component Value Date/Time    SODIUM 134 (L) 09/21/2021 06:29 AM    POTASSIUM 4.4 09/21/2021 06:29 AM    CHLORIDE 97 09/21/2021 06:29 AM    CO2 24 09/21/2021 06:29 AM    GLUCOSE 305 (H) 09/21/2021 06:29 AM    BUN 14 09/21/2021 06:29 AM    CREATININE 0.64 09/21/2021 06:29 AM     Lab Results   Component Value Date/Time    ALKPHOSPHAT 105 (H) 09/21/2021 06:29 AM    ASTSGOT 13 09/21/2021 06:29 AM    ALTSGPT 11 09/21/2021 06:29 AM    TBILIRUBIN 0.6 09/21/2021 06:29 AM    ALBUMIN 3.9 09/21/2021 06:29 AM      Lab Results   Component Value Date/Time    MALBCRT 11 09/21/2021 06:29 AM    MICROALBUR 2.8 09/21/2021 06:29 AM     Immunization History   Administered Date(s) Administered    Hepatitis A Vaccine, Adult 12/01/2015    MODERNA SARS-COV-2 VACCINE (12+) 04/21/2021, 06/02/2021    Tdap Vaccine 05/13/2015    Typhoid Vaccine 12/01/2015         Medications:    Current Outpatient Medications:     insulin lispro, 10 Units, Subcutaneous, TID AC    sertraline, 50 mg, Oral, QDAY    propranolol, 10 mg, Oral, TID PRN    Blood Glucose Meter Kit,  "Test blood sugar as recommended by provider.  blood glucose monitoring kit.    Blood Glucose Test Strips, Use one strip to test blood sugar three times daily.    Lancets, Use one lancet to test blood sugar three times daily.    Alcohol Swabs, Wipe site with prep pad prior to injection.    Insulin Syringes U-100 0.3 mL, Use one syringe to inject insulin four times daily.    insulin glargine, 15 Units, Subcutaneous, Q EVENING    atorvastatin, 20 mg, Oral, Q EVENING (Patient not taking: Reported on 4/24/2024)    SYRINGE 3CC/25GX1\", Use with Novolag 3 times a day and Lantus 2 times per day.          Assessment and Plan:  1. DM2   She reports having confirmatory testing for ABUNDIO approximately 10 years ago, as seen above  She has an appointment scheduled with endocrinology at the end of the month with [facility].   We discussed how to titrate insulin appropriately at this appointment.  We did discuss that many drugs are used off-label for ABUNDIO, however, I prefer she will discuss these with endocrinology first, per our CPA.   REC score   N/a  Most recent A1c is: >11  Previous DM medications and outcomes and/or SEs:  Metformin -  N/V/D  Is Medication cost affordable: y  Kidney function:  wnl   SMBG:  AM:200-250  Before meals:  PM: -  Any hypoglycemia: no  15:15 Rule discussed with patient    https://diabetesjournals.org/diabetes/article/[email or web address]/2037/[email or web address]/Management-of-Latent-Autoimmune-Diabetes-in-Adults    The majority of ABUNDIO patients are clinically diagnosed as having T2D and treated initially with metformin before they are identified as having ABUNDIO. The panel concluded that although there is little evidence for the use of metformin, there is no evidence against its use. Metformin can increase insulin sensitivity in T1D (55) without evidence that it could improve long-term glycemic control; in addition, it might reduce weight, LDL cholesterol levels, and the risk of atherosclerosis progression (56).     The panel concluded " that there is limited evidence supporting the use of metformin and few studies using TZD, so the efficacy of both compounds appears inconclusive. For TZD, the potential risk of atypical bone fractures, macular edema, and weight gain could be a limitation to the use of these compounds.     The panel concluded that sulfonylureas are not recommended for the treatment of ABUNDIO, as deterioration of ?-cell function as a consequence of this treatment cannot be ruled out.     The panel concluded that DPP-4i may improve glycemic control in ABUNDIO patients with a good safety profile. Larger randomized studies are warranted to prove that DPP-4i might preserve C-peptide secretion.     The panel concluded that the approved use of SGLT2i in both T2D and selected T1D patients, in particular those overweight, suggests that they may be promising agents in ABUNDIO. However, no studies have been performed in ABUNDIO and attention should be paid to ketoacidosis in patients with medium to low C-peptide.     The panel concluded that GLP-1RA have shown beneficial results in terms of improving metabolic control in ABUNDIO patients unless C-peptide levels are very low. These drugs are approved in T2D and in insulin-treated patients, but more evidence is required in patients with ABUNDIO.                     Medication(s) recommended after today's visit:   Increase to Glargine 32 units HS  Increase to lispro 10-12 units TID   CGM: Freestyle Jenny 3  Not able to tolerate metformin  Consider DPP4 or GLP-1    Long-acting insulin:   Adjust dose according to FASTING BLOOD GLUCOSE target 100-130 mg/dL  (1) Increase the insulin dose every 3-4 days as needed.   (2) Increase by 2 units if FBG average concentration is 131-170 mg/dL.   (3) Increase by 4 units if FBG average concentration is 171-210 mg/dL.   (4) Increase by 6 units if FBG average concentration is 221-260 mg/dL.   (5) Increase by 8 units if FBG average concentration is greater than 261mg/dL and call  us.   Consider cutting back by 1-2 units to previous dose if glucose concentration is below 100 mg/dL or symptoms of hypoglycemia.       Rapid-acting insulin   Glucose reading  Change to be made to insulin dose    < 80 mg/dL  Subtract 1 unit     mg/dL  Usual premeal dose   (fixed or based on carbohydrate intake)    126-170 mg/dL  Add 1 unit    171-215 mg/dL  Add 2 units    216-260 mg/dL  Add 3 units         2.  Cardiovascular  Is LDL <100: N    Is Blood pressure <130/80:  Y    Medication(s) recommended.   Statin: y  ACE/ARB: n  Maybe at next appt     3.  Lifestyle changes   Focus on eating a DASH/Mediterranean-style diet.   Exercise 30 minutes daily at least 5 days/week, as tolerated.    Follow-up appointment in 8 week(s)    Varun Valerio, PharmD, MS, BCACP, [facility]  Phone [phone] fax [phone]    This note was created using voice recognition software (Dragon). The accuracy of the dictation is limited by the abilities of the software. I have reviewed the note prior to signing, however some errors in grammar and context are still possible. If you have any questions related to this note please do not hesitate to contact our office.     CC:   SHELLIE Kim Melanie, A.*  Dr. Guillaume Alexis

## 2024-05-06 NOTE — Clinical Note
Dr. Alexis,   Diabetes with ABUNDIO.  She has had at least 10 years  She has an appointment with De[facility] in approximately 3 weeks.   I did not make any changes to her meds today, just helped her with insulin titration and discussed possible drug options  Thanks, Ned

## 2024-07-16 ENCOUNTER — OFFICE VISIT (OUTPATIENT)
Dept: MEDICAL GROUP | Facility: PHYSICIAN GROUP | Age: 54
End: 2024-07-16
Payer: COMMERCIAL

## 2024-07-16 VITALS
HEART RATE: 80 BPM | SYSTOLIC BLOOD PRESSURE: 130 MMHG | BODY MASS INDEX: 41.59 KG/M2 | DIASTOLIC BLOOD PRESSURE: 82 MMHG | OXYGEN SATURATION: 94 % | TEMPERATURE: 97.9 F | WEIGHT: 249.6 LBS | HEIGHT: 65 IN

## 2024-07-16 DIAGNOSIS — R20.2 PARESTHESIA AND PAIN OF BOTH UPPER EXTREMITIES: ICD-10-CM

## 2024-07-16 DIAGNOSIS — M25.50 POLYARTHRALGIA: ICD-10-CM

## 2024-07-16 DIAGNOSIS — E13.9 DIABETES MELLITUS TYPE 1.5, MANAGED AS TYPE 1 (HCC): ICD-10-CM

## 2024-07-16 DIAGNOSIS — M79.602 PARESTHESIA AND PAIN OF BOTH UPPER EXTREMITIES: ICD-10-CM

## 2024-07-16 DIAGNOSIS — M79.601 PARESTHESIA AND PAIN OF BOTH UPPER EXTREMITIES: ICD-10-CM

## 2024-07-16 PROBLEM — I10 ESSENTIAL HYPERTENSION: Status: ACTIVE | Noted: 2024-07-16

## 2024-07-16 PROBLEM — E03.9 HYPOTHYROIDISM: Status: ACTIVE | Noted: 2024-07-16

## 2024-07-16 PROBLEM — E78.5 DYSLIPIDEMIA: Status: ACTIVE | Noted: 2024-07-16

## 2024-07-16 PROBLEM — E55.9 VITAMIN D DEFICIENCY: Status: ACTIVE | Noted: 2024-07-16

## 2024-07-16 PROCEDURE — 3075F SYST BP GE 130 - 139MM HG: CPT | Performed by: FAMILY MEDICINE

## 2024-07-16 PROCEDURE — 99214 OFFICE O/P EST MOD 30 MIN: CPT | Performed by: FAMILY MEDICINE

## 2024-07-16 PROCEDURE — 3079F DIAST BP 80-89 MM HG: CPT | Performed by: FAMILY MEDICINE

## 2024-07-16 RX ORDER — INSULIN LISPRO 100 [IU]/ML
20 INJECTION, SOLUTION INTRAVENOUS; SUBCUTANEOUS
Qty: 60 ML | Refills: 0 | Status: SHIPPED | OUTPATIENT
Start: 2024-07-16

## 2024-07-16 RX ORDER — INSULIN LISPRO 100 [IU]/ML
20 INJECTION, SOLUTION INTRAVENOUS; SUBCUTANEOUS
Qty: 60 ML | Refills: 0 | Status: SHIPPED | OUTPATIENT
Start: 2024-07-16 | End: 2024-07-16 | Stop reason: SDUPTHER

## 2024-07-16 ASSESSMENT — ENCOUNTER SYMPTOMS
PALPITATIONS: 0
NAUSEA: 0
SHORTNESS OF BREATH: 0
VOMITING: 0
ABDOMINAL PAIN: 0

## 2024-07-27 ENCOUNTER — HOSPITAL ENCOUNTER (OUTPATIENT)
Dept: RADIOLOGY | Facility: MEDICAL CENTER | Age: 54
End: 2024-07-27
Attending: FAMILY MEDICINE
Payer: COMMERCIAL

## 2024-07-27 DIAGNOSIS — M25.50 POLYARTHRALGIA: ICD-10-CM

## 2024-07-27 DIAGNOSIS — M79.601 PARESTHESIA AND PAIN OF BOTH UPPER EXTREMITIES: ICD-10-CM

## 2024-07-27 DIAGNOSIS — M79.602 PARESTHESIA AND PAIN OF BOTH UPPER EXTREMITIES: ICD-10-CM

## 2024-07-27 DIAGNOSIS — R20.2 PARESTHESIA AND PAIN OF BOTH UPPER EXTREMITIES: ICD-10-CM

## 2024-07-27 PROCEDURE — 72040 X-RAY EXAM NECK SPINE 2-3 VW: CPT

## 2024-08-05 ENCOUNTER — HOSPITAL ENCOUNTER (OUTPATIENT)
Dept: LAB | Facility: MEDICAL CENTER | Age: 54
End: 2024-08-05
Attending: FAMILY MEDICINE
Payer: COMMERCIAL

## 2024-08-05 DIAGNOSIS — M79.602 PARESTHESIA AND PAIN OF BOTH UPPER EXTREMITIES: ICD-10-CM

## 2024-08-05 DIAGNOSIS — R20.2 PARESTHESIA AND PAIN OF BOTH UPPER EXTREMITIES: ICD-10-CM

## 2024-08-05 DIAGNOSIS — M25.50 POLYARTHRALGIA: ICD-10-CM

## 2024-08-05 DIAGNOSIS — M79.601 PARESTHESIA AND PAIN OF BOTH UPPER EXTREMITIES: ICD-10-CM

## 2024-08-05 LAB
ALBUMIN SERPL BCP-MCNC: 3.7 G/DL (ref 3.2–4.9)
ALBUMIN/GLOB SERPL: 1.1 G/DL
ALP SERPL-CCNC: 120 U/L (ref 30–99)
ALT SERPL-CCNC: 12 U/L (ref 2–50)
ANION GAP SERPL CALC-SCNC: 15 MMOL/L (ref 7–16)
AST SERPL-CCNC: 12 U/L (ref 12–45)
BILIRUB SERPL-MCNC: 0.4 MG/DL (ref 0.1–1.5)
BUN SERPL-MCNC: 25 MG/DL (ref 8–22)
CALCIUM ALBUM COR SERPL-MCNC: 9.6 MG/DL (ref 8.5–10.5)
CALCIUM SERPL-MCNC: 9.4 MG/DL (ref 8.5–10.5)
CHLORIDE SERPL-SCNC: 99 MMOL/L (ref 96–112)
CO2 SERPL-SCNC: 23 MMOL/L (ref 20–33)
CREAT SERPL-MCNC: 0.68 MG/DL (ref 0.5–1.4)
CRP SERPL HS-MCNC: 1.36 MG/DL (ref 0–0.75)
ERYTHROCYTE [DISTWIDTH] IN BLOOD BY AUTOMATED COUNT: 41.7 FL (ref 35.9–50)
ERYTHROCYTE [SEDIMENTATION RATE] IN BLOOD BY WESTERGREN METHOD: 17 MM/HOUR (ref 0–25)
GFR SERPLBLD CREATININE-BSD FMLA CKD-EPI: 103 ML/MIN/1.73 M 2
GLOBULIN SER CALC-MCNC: 3.3 G/DL (ref 1.9–3.5)
GLUCOSE SERPL-MCNC: 305 MG/DL (ref 65–99)
HCT VFR BLD AUTO: 43.3 % (ref 37–47)
HGB BLD-MCNC: 14.7 G/DL (ref 12–16)
IRON SATN MFR SERPL: 12 % (ref 15–55)
IRON SERPL-MCNC: 41 UG/DL (ref 40–170)
MCH RBC QN AUTO: 30.1 PG (ref 27–33)
MCHC RBC AUTO-ENTMCNC: 33.9 G/DL (ref 32.2–35.5)
MCV RBC AUTO: 88.5 FL (ref 81.4–97.8)
PLATELET # BLD AUTO: 312 K/UL (ref 164–446)
PMV BLD AUTO: 10.3 FL (ref 9–12.9)
POTASSIUM SERPL-SCNC: 3.9 MMOL/L (ref 3.6–5.5)
PROT SERPL-MCNC: 7 G/DL (ref 6–8.2)
RBC # BLD AUTO: 4.89 M/UL (ref 4.2–5.4)
RHEUMATOID FACT SER IA-ACNC: <10 IU/ML (ref 0–14)
SODIUM SERPL-SCNC: 137 MMOL/L (ref 135–145)
TIBC SERPL-MCNC: 352 UG/DL (ref 250–450)
TSH SERPL DL<=0.005 MIU/L-ACNC: 2.36 UIU/ML (ref 0.38–5.33)
UIBC SERPL-MCNC: 311 UG/DL (ref 110–370)
VIT B12 SERPL-MCNC: 321 PG/ML (ref 211–911)
WBC # BLD AUTO: 15.6 K/UL (ref 4.8–10.8)

## 2024-08-05 PROCEDURE — 86812 HLA TYPING A B OR C: CPT

## 2024-08-05 PROCEDURE — 86140 C-REACTIVE PROTEIN: CPT

## 2024-08-05 PROCEDURE — 84155 ASSAY OF PROTEIN SERUM: CPT

## 2024-08-05 PROCEDURE — 86431 RHEUMATOID FACTOR QUANT: CPT

## 2024-08-05 PROCEDURE — 83540 ASSAY OF IRON: CPT

## 2024-08-05 PROCEDURE — 86334 IMMUNOFIX E-PHORESIS SERUM: CPT

## 2024-08-05 PROCEDURE — 85652 RBC SED RATE AUTOMATED: CPT

## 2024-08-05 PROCEDURE — 84443 ASSAY THYROID STIM HORMONE: CPT

## 2024-08-05 PROCEDURE — 86160 COMPLEMENT ANTIGEN: CPT | Mod: 91

## 2024-08-05 PROCEDURE — 84165 PROTEIN E-PHORESIS SERUM: CPT

## 2024-08-05 PROCEDURE — 80053 COMPREHEN METABOLIC PANEL: CPT

## 2024-08-05 PROCEDURE — 36415 COLL VENOUS BLD VENIPUNCTURE: CPT

## 2024-08-05 PROCEDURE — 83550 IRON BINDING TEST: CPT

## 2024-08-05 PROCEDURE — 86038 ANTINUCLEAR ANTIBODIES: CPT

## 2024-08-05 PROCEDURE — 85027 COMPLETE CBC AUTOMATED: CPT

## 2024-08-05 PROCEDURE — 82607 VITAMIN B-12: CPT

## 2024-08-07 LAB
C3 SERPL-MCNC: 195 MG/DL (ref 90–180)
C4 SERPL-MCNC: 36 MG/DL (ref 10–40)
HLA-B27 QL FC: NEGATIVE
NUCLEAR IGG SER QL IA: ABNORMAL
RHEUMATOID FACT SER NEPH-ACNC: <10 IU/ML (ref 0–14)

## 2024-08-08 LAB
ALBUMIN SERPL ELPH-MCNC: 3.8 G/DL (ref 3.75–5.01)
ALPHA1 GLOB SERPL ELPH-MCNC: 0.28 G/DL (ref 0.19–0.46)
ALPHA2 GLOB SERPL ELPH-MCNC: 0.94 G/DL (ref 0.48–1.05)
B-GLOBULIN SERPL ELPH-MCNC: 1 G/DL (ref 0.48–1.1)
GAMMA GLOB SERPL ELPH-MCNC: 0.88 G/DL (ref 0.62–1.51)
INTERPRETATION SERPL IFE-IMP: NORMAL
INTERPRETATION SERPL IFE-IMP: NORMAL
MONOCLONAL PROTEIN NL11656: NORMAL G/DL
PATHOLOGY STUDY: NORMAL
PROT SERPL-MCNC: 6.9 G/DL (ref 6.3–8.2)

## 2024-08-28 ENCOUNTER — HOSPITAL ENCOUNTER (OUTPATIENT)
Facility: MEDICAL CENTER | Age: 54
End: 2024-08-28
Attending: FAMILY MEDICINE
Payer: COMMERCIAL

## 2024-08-28 ENCOUNTER — OFFICE VISIT (OUTPATIENT)
Dept: MEDICAL GROUP | Facility: PHYSICIAN GROUP | Age: 54
End: 2024-08-28
Payer: COMMERCIAL

## 2024-08-28 VITALS
WEIGHT: 252.6 LBS | HEIGHT: 65 IN | TEMPERATURE: 96.5 F | BODY MASS INDEX: 42.09 KG/M2 | SYSTOLIC BLOOD PRESSURE: 146 MMHG | DIASTOLIC BLOOD PRESSURE: 72 MMHG | HEART RATE: 76 BPM | OXYGEN SATURATION: 96 %

## 2024-08-28 DIAGNOSIS — I10 ESSENTIAL HYPERTENSION: ICD-10-CM

## 2024-08-28 DIAGNOSIS — G63 NEUROPATHY ASSOCIATED WITH ENDOCRINE DISORDER (HCC): ICD-10-CM

## 2024-08-28 DIAGNOSIS — E34.9 NEUROPATHY ASSOCIATED WITH ENDOCRINE DISORDER (HCC): ICD-10-CM

## 2024-08-28 DIAGNOSIS — E13.9 DIABETES MELLITUS TYPE 1.5, MANAGED AS TYPE 1 (HCC): ICD-10-CM

## 2024-08-28 DIAGNOSIS — D72.829 LEUKOCYTOSIS, UNSPECIFIED TYPE: ICD-10-CM

## 2024-08-28 DIAGNOSIS — Z51.81 THERAPEUTIC DRUG MONITORING: ICD-10-CM

## 2024-08-28 DIAGNOSIS — E78.5 DYSLIPIDEMIA: ICD-10-CM

## 2024-08-28 LAB
HBA1C MFR BLD: 10.1 % (ref ?–5.8)
POCT INT CON NEG: NEGATIVE
POCT INT CON POS: POSITIVE

## 2024-08-28 PROCEDURE — 82570 ASSAY OF URINE CREATININE: CPT

## 2024-08-28 PROCEDURE — G0481 DRUG TEST DEF 8-14 CLASSES: HCPCS

## 2024-08-28 PROCEDURE — 82043 UR ALBUMIN QUANTITATIVE: CPT

## 2024-08-28 RX ORDER — INSULIN LISPRO 100 [IU]/ML
24 INJECTION, SOLUTION INTRAVENOUS; SUBCUTANEOUS
Qty: 60 ML | Refills: 0 | Status: SHIPPED | OUTPATIENT
Start: 2024-08-28

## 2024-08-28 RX ORDER — PREGABALIN 75 MG/1
75 CAPSULE ORAL 2 TIMES DAILY
Qty: 60 CAPSULE | Refills: 2 | Status: SHIPPED | OUTPATIENT
Start: 2024-08-28 | End: 2024-11-26

## 2024-08-28 ASSESSMENT — FIBROSIS 4 INDEX: FIB4 SCORE: 0.6

## 2024-08-28 NOTE — PROGRESS NOTES
"Verbal consent was acquired by the patient to use [employer] ambient listening note generation during this visit     Subjective:     HPI:   History of Present Illness  The patient presents for evaluation of multiple medical concerns.    She administers her Lantus insulin at 8:00 PM due to her liver's tendency to overproduce glucose overnight. Her morning blood sugar levels range from 100 to 150, never dropping below 100 in the mornings. She experiences slow digestion, with her blood sugar spiking to the upper 200s or low 300s two hours post-meal, and returning to around 150 after five to six hours. She takes 20 units of insulin three times a day before meals. Even without snacking at night, her blood sugar remains high before bedtime. In the past, she would consume peanut butter or crackers if her blood sugar dropped to 90 at 10:00 PM to prevent hypoglycemia, but this is no longer necessary. She has been unable to schedule an appointment with her endocrinologist due to scheduling conflicts.    She experiences severe pain that intensifies by the end of the day, rating it as 4 on a scale of 10 during the day and escalating to 7 or 8 at night, to the point where she is in tears and unable to speak.    She has observed that her blood pressure rises when she stands up too quickly, a symptom she first noticed after jonelle COVID-19. This has become more frequent over the past six months. Her mother, a nurse, suggested it might be postural orthostatic tachycardia syndrome. However, her pulse rate remains normal. Patient is not currently on any blood pressure medication.          Objective:     Exam:  BP (!) 146/72 (BP Location: Left arm, Patient Position: Sitting, BP Cuff Size: Adult)   Pulse 76   Temp 35.8 °C (96.5 °F) (Temporal)   Ht 1.651 m (5' 5\")   Wt 115 kg (252 lb 9.6 oz)   SpO2 96%   BMI 42.03 kg/m²  Body mass index is 42.03 kg/m².    Physical Exam  Constitutional:       Appearance: Normal appearance. "   HENT:      Head: Normocephalic and atraumatic.      Nose: Nose normal.      Mouth/Throat:      Mouth: Mucous membranes are moist.   Eyes:      Extraocular Movements: Extraocular movements intact.      Conjunctiva/sclera: Conjunctivae normal.      Pupils: Pupils are equal, round, and reactive to light.   Cardiovascular:      Rate and Rhythm: Normal rate and regular rhythm.      Heart sounds: No murmur heard.  Pulmonary:      Effort: Pulmonary effort is normal.      Breath sounds: Normal breath sounds.   Abdominal:      General: Abdomen is flat. Bowel sounds are normal.   Musculoskeletal:      Cervical back: Neck supple.   Skin:     General: Skin is warm and dry.   Neurological:      General: No focal deficit present.      Mental Status: She is alert and oriented to person, place, and time.   Psychiatric:         Mood and Affect: Mood normal.           Assessment & Plan:     1. 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 Therapeutic drug monitoring  Pain Management Screen    Controlled Substance Treatment Agreement      4. Dyslipidemia        5. Essential hypertension        6. Leukocytosis, unspecified type  CBC WITH DIFFERENTIAL          Assessment & Plan  1. Diabetes Mellitus, Type 1.5, managed as Type 1,   chronic, uncontrolled. A1c in office today is 10.1. The long-acting insulin will be increased from 60 units to 62 units every evening. The short-acting insulin will be increased from 20 units three times a day before meals to 24 units three times a day before meals. She is  to continue trying to establish care with endocrinology.    2. Neuropathy associated with endocrine disorder.  The exact etiology of her pain is unclear, but given her lab workup was only positive for signs of inflammation, it may be due to her uncontrolled diabetes. Lyrica will be started. Possible side effects, including fuzziness, fogginess, and leg swelling, were discussed. A controlled substance agreement was signed today.  was reviewed, and a urine drug screen was done today.    3. Dyslipidemia.  She is due for lab work, which will be ordered today.    4. Essential Hypertension.  Her blood pressure has been controlled with lifestyle modifications; however, today, her blood pressure in the office was 152/82 and repeat was 146/72. Will not start medication at this time but if elevated again at next apt will discuss    5. Leukocytosis.  The etiology is unclear. A repeat CBC will be ordered to check if the white count has gone back down.    Follow-up  She will follow up in 6 weeks.        Return in about 6 weeks (around 10/9/2024) for Med F/U, Lab F/U.      I personally reviewed and updated and reviewed the patient's personal, social, family and surgical history at today's appointment.     Secondary to the complexity of this patient's illnesses and their interactions.  All problems listed were discussed during the office visit, medications were evaluated and complexities were discussed as well as plan for the future.    Please note that this dictation was created using voice recognition software. I have made every reasonable attempt to correct obvious errors, but I expect that there are errors of grammar and possibly content that I did not discover before finalizing the note.

## 2024-08-29 LAB
CREAT UR-MCNC: 222.39 MG/DL
MICROALBUMIN UR-MCNC: 6.7 MG/DL
MICROALBUMIN/CREAT UR: 30 MG/G (ref 0–30)

## 2024-09-01 LAB
1OH-MIDAZOLAM UR QL SCN: NOT DETECTED
6MAM UR QL: NOT DETECTED
7AMINOCLONAZEPAM UR QL: NOT DETECTED
A-OH ALPRAZ UR QL: NOT DETECTED
ALPRAZ UR QL: NOT DETECTED
AMPHET UR QL SCN: NOT DETECTED
ANNOTATION COMMENT IMP: NORMAL
BARBITURATES UR QL: NEGATIVE
BUPRENORPHINE UR QL: NOT DETECTED
BZE UR QL: NEGATIVE
CARBOXYTHC UR QL: NORMAL
CARISOPRODOL UR QL: NEGATIVE
CLONAZEPAM UR QL: NOT DETECTED
CODEINE UR QL: NOT DETECTED
CREAT UR-MCNC: 232.8 MG/DL (ref 20–400)
DIAZEPAM UR QL: NOT DETECTED
ETHYL GLUCURONIDE UR QL: NEGATIVE
FENTANYL UR QL: NOT DETECTED
GABAPENTIN UR QL CFM: NOT DETECTED
HYDROCODONE UR QL: NOT DETECTED
HYDROMORPHONE UR QL: NOT DETECTED
LORAZEPAM UR QL: NOT DETECTED
MDA UR QL: NOT DETECTED
MDEA UR QL: NOT DETECTED
MDMA UR QL: NOT DETECTED
ME-PHENIDATE UR QL: NOT DETECTED
METHADONE UR QL: NEGATIVE
METHAMPHET UR QL: NOT DETECTED
MIDAZOLAM UR QL SCN: NOT DETECTED
MORPHINE UR QL: NOT DETECTED
NALOXONE UR QL CFM: NOT DETECTED
NORBUPRENORPHINE UR QL CFM: NOT DETECTED
NORDIAZEPAM UR QL: NOT DETECTED
NORFENTANYL UR QL: NOT DETECTED
NORHYDROCODONE UR QL CFM: NOT DETECTED
NORMEPERIDINE UR QL CFM: NOT DETECTED
NOROXYCODONE UR QL CFM: NOT DETECTED
NOROXYMORPHONE UR QL SCN: NOT DETECTED
OXAZEPAM UR QL: NOT DETECTED
OXYCODONE UR QL: NOT DETECTED
OXYMORPHONE UR QL: NOT DETECTED
PATHOLOGY STUDY: NORMAL
PCP UR QL: NEGATIVE
PHENTERMINE UR QL: NOT DETECTED
PREGABALIN UR QL CFM: NOT DETECTED
SERVICE CMNT-IMP: NORMAL
TAPENTADOL UR QL SCN: NOT DETECTED
TAPENTADOL UR QL SCN: NOT DETECTED
TEMAZEPAM UR QL: NOT DETECTED
TRAMADOL UR QL: NEGATIVE
ZOLPIDEM PHENYL-4-CARB UR QL SCN: NOT DETECTED
ZOLPIDEM UR QL: NOT DETECTED

## 2024-10-23 ENCOUNTER — OFFICE VISIT (OUTPATIENT)
Dept: MEDICAL GROUP | Facility: PHYSICIAN GROUP | Age: 54
End: 2024-10-23
Payer: COMMERCIAL

## 2024-10-23 VITALS
RESPIRATION RATE: 16 BRPM | SYSTOLIC BLOOD PRESSURE: 136 MMHG | WEIGHT: 252 LBS | BODY MASS INDEX: 41.99 KG/M2 | HEART RATE: 101 BPM | DIASTOLIC BLOOD PRESSURE: 82 MMHG | HEIGHT: 65 IN | TEMPERATURE: 97.6 F | OXYGEN SATURATION: 96 %

## 2024-10-23 DIAGNOSIS — G63 NEUROPATHY ASSOCIATED WITH ENDOCRINE DISORDER (HCC): ICD-10-CM

## 2024-10-23 DIAGNOSIS — Z11.59 NEED FOR HEPATITIS C SCREENING TEST: ICD-10-CM

## 2024-10-23 DIAGNOSIS — E66.813 CLASS 3 SEVERE OBESITY DUE TO EXCESS CALORIES WITH SERIOUS COMORBIDITY AND BODY MASS INDEX (BMI) OF 40.0 TO 44.9 IN ADULT (HCC): ICD-10-CM

## 2024-10-23 DIAGNOSIS — Z23 NEED FOR VACCINATION: ICD-10-CM

## 2024-10-23 DIAGNOSIS — Z01.84 IMMUNITY STATUS TESTING: ICD-10-CM

## 2024-10-23 DIAGNOSIS — E34.9 NEUROPATHY ASSOCIATED WITH ENDOCRINE DISORDER (HCC): ICD-10-CM

## 2024-10-23 DIAGNOSIS — E66.01 CLASS 3 SEVERE OBESITY DUE TO EXCESS CALORIES WITH SERIOUS COMORBIDITY AND BODY MASS INDEX (BMI) OF 40.0 TO 44.9 IN ADULT (HCC): ICD-10-CM

## 2024-10-23 DIAGNOSIS — E78.5 DYSLIPIDEMIA: ICD-10-CM

## 2024-10-23 DIAGNOSIS — E13.9 DIABETES MELLITUS TYPE 1.5, MANAGED AS TYPE 1 (HCC): ICD-10-CM

## 2024-10-23 PROCEDURE — 90471 IMMUNIZATION ADMIN: CPT | Performed by: FAMILY MEDICINE

## 2024-10-23 PROCEDURE — 90472 IMMUNIZATION ADMIN EACH ADD: CPT | Performed by: FAMILY MEDICINE

## 2024-10-23 PROCEDURE — 90677 PCV20 VACCINE IM: CPT | Performed by: FAMILY MEDICINE

## 2024-10-23 PROCEDURE — 3079F DIAST BP 80-89 MM HG: CPT | Performed by: FAMILY MEDICINE

## 2024-10-23 PROCEDURE — 90656 IIV3 VACC NO PRSV 0.5 ML IM: CPT | Performed by: FAMILY MEDICINE

## 2024-10-23 PROCEDURE — 99215 OFFICE O/P EST HI 40 MIN: CPT | Mod: 25 | Performed by: FAMILY MEDICINE

## 2024-10-23 PROCEDURE — 3075F SYST BP GE 130 - 139MM HG: CPT | Performed by: FAMILY MEDICINE

## 2024-10-23 RX ORDER — PREGABALIN 75 MG/1
75 CAPSULE ORAL 2 TIMES DAILY
Qty: 180 CAPSULE | Refills: 0 | Status: SHIPPED | OUTPATIENT
Start: 2024-10-23 | End: 2025-01-21

## 2024-10-23 RX ORDER — ATORVASTATIN CALCIUM 20 MG/1
1 TABLET, FILM COATED ORAL
COMMUNITY

## 2024-10-23 ASSESSMENT — FIBROSIS 4 INDEX: FIB4 SCORE: 0.6

## 2024-11-04 ENCOUNTER — HOSPITAL ENCOUNTER (OUTPATIENT)
Dept: LAB | Facility: MEDICAL CENTER | Age: 54
End: 2024-11-04
Attending: FAMILY MEDICINE
Payer: COMMERCIAL

## 2024-11-04 ENCOUNTER — HOSPITAL ENCOUNTER (OUTPATIENT)
Dept: LAB | Facility: MEDICAL CENTER | Age: 54
End: 2024-11-04
Attending: PHYSICIAN ASSISTANT
Payer: COMMERCIAL

## 2024-11-04 DIAGNOSIS — Z01.84 IMMUNITY STATUS TESTING: ICD-10-CM

## 2024-11-04 DIAGNOSIS — E78.5 DYSLIPIDEMIA: ICD-10-CM

## 2024-11-04 DIAGNOSIS — E13.9 DIABETES MELLITUS TYPE 1.5, MANAGED AS TYPE 1 (HCC): ICD-10-CM

## 2024-11-04 DIAGNOSIS — Z11.59 NEED FOR HEPATITIS C SCREENING TEST: ICD-10-CM

## 2024-11-04 LAB
ALBUMIN SERPL BCP-MCNC: 4 G/DL (ref 3.2–4.9)
ALBUMIN/GLOB SERPL: 1.1 G/DL
ALP SERPL-CCNC: 98 U/L (ref 30–99)
ALT SERPL-CCNC: 13 U/L (ref 2–50)
ANION GAP SERPL CALC-SCNC: 14 MMOL/L (ref 7–16)
AST SERPL-CCNC: 21 U/L (ref 12–45)
BASOPHILS # BLD AUTO: 0.6 % (ref 0–1.8)
BASOPHILS # BLD: 0.08 K/UL (ref 0–0.12)
BILIRUB SERPL-MCNC: 0.3 MG/DL (ref 0.1–1.5)
BUN SERPL-MCNC: 14 MG/DL (ref 8–22)
CALCIUM ALBUM COR SERPL-MCNC: 9.9 MG/DL (ref 8.5–10.5)
CALCIUM SERPL-MCNC: 9.9 MG/DL (ref 8.5–10.5)
CHLORIDE SERPL-SCNC: 101 MMOL/L (ref 96–112)
CHOLEST SERPL-MCNC: 246 MG/DL (ref 100–199)
CHOLEST SERPL-MCNC: 253 MG/DL (ref 100–199)
CO2 SERPL-SCNC: 24 MMOL/L (ref 20–33)
CREAT SERPL-MCNC: 0.56 MG/DL (ref 0.5–1.4)
CREAT UR-MCNC: 148.97 MG/DL
EOSINOPHIL # BLD AUTO: 0.21 K/UL (ref 0–0.51)
EOSINOPHIL NFR BLD: 1.6 % (ref 0–6.9)
ERYTHROCYTE [DISTWIDTH] IN BLOOD BY AUTOMATED COUNT: 42.6 FL (ref 35.9–50)
GFR SERPLBLD CREATININE-BSD FMLA CKD-EPI: 108 ML/MIN/1.73 M 2
GLOBULIN SER CALC-MCNC: 3.6 G/DL (ref 1.9–3.5)
GLUCOSE SERPL-MCNC: 206 MG/DL (ref 65–99)
HBV SURFACE AB SERPL IA-ACNC: <3.5 MIU/ML (ref 0–10)
HCT VFR BLD AUTO: 46.5 % (ref 37–47)
HCV AB SER QL: NORMAL
HDLC SERPL-MCNC: 52 MG/DL
HDLC SERPL-MCNC: 57 MG/DL
HGB BLD-MCNC: 15.5 G/DL (ref 12–16)
IMM GRANULOCYTES # BLD AUTO: 0.08 K/UL (ref 0–0.11)
IMM GRANULOCYTES NFR BLD AUTO: 0.6 % (ref 0–0.9)
LDLC SERPL CALC-MCNC: 150 MG/DL
LDLC SERPL CALC-MCNC: 151 MG/DL
LYMPHOCYTES # BLD AUTO: 1.96 K/UL (ref 1–4.8)
LYMPHOCYTES NFR BLD: 14.9 % (ref 22–41)
MCH RBC QN AUTO: 29.7 PG (ref 27–33)
MCHC RBC AUTO-ENTMCNC: 33.3 G/DL (ref 32.2–35.5)
MCV RBC AUTO: 89.1 FL (ref 81.4–97.8)
MICROALBUMIN UR-MCNC: 5.4 MG/DL
MICROALBUMIN/CREAT UR: 36 MG/G (ref 0–30)
MONOCYTES # BLD AUTO: 0.58 K/UL (ref 0–0.85)
MONOCYTES NFR BLD AUTO: 4.4 % (ref 0–13.4)
NEUTROPHILS # BLD AUTO: 10.27 K/UL (ref 1.82–7.42)
NEUTROPHILS NFR BLD: 77.9 % (ref 44–72)
NRBC # BLD AUTO: 0 K/UL
NRBC BLD-RTO: 0 /100 WBC (ref 0–0.2)
PLATELET # BLD AUTO: 360 K/UL (ref 164–446)
PMV BLD AUTO: 10.5 FL (ref 9–12.9)
POTASSIUM SERPL-SCNC: 3.7 MMOL/L (ref 3.6–5.5)
PROT SERPL-MCNC: 7.6 G/DL (ref 6–8.2)
RBC # BLD AUTO: 5.22 M/UL (ref 4.2–5.4)
SODIUM SERPL-SCNC: 139 MMOL/L (ref 135–145)
T4 FREE SERPL-MCNC: 1.04 NG/DL (ref 0.93–1.7)
THYROPEROXIDASE AB SERPL-ACNC: 18 IU/ML (ref 0–9)
TRIGL SERPL-MCNC: 218 MG/DL (ref 0–149)
TRIGL SERPL-MCNC: 224 MG/DL (ref 0–149)
TSH SERPL-ACNC: 1.49 UIU/ML (ref 0.35–5.5)
WBC # BLD AUTO: 13.2 K/UL (ref 4.8–10.8)

## 2024-11-04 PROCEDURE — 86803 HEPATITIS C AB TEST: CPT

## 2024-11-04 PROCEDURE — 80061 LIPID PANEL: CPT | Mod: 91

## 2024-11-04 PROCEDURE — 86337 INSULIN ANTIBODIES: CPT

## 2024-11-04 PROCEDURE — 36415 COLL VENOUS BLD VENIPUNCTURE: CPT

## 2024-11-04 PROCEDURE — 80061 LIPID PANEL: CPT

## 2024-11-04 PROCEDURE — 86376 MICROSOMAL ANTIBODY EACH: CPT

## 2024-11-04 PROCEDURE — 84681 ASSAY OF C-PEPTIDE: CPT

## 2024-11-04 PROCEDURE — 86706 HEP B SURFACE ANTIBODY: CPT

## 2024-11-04 PROCEDURE — 85025 COMPLETE CBC W/AUTO DIFF WBC: CPT

## 2024-11-04 PROCEDURE — 86341 ISLET CELL ANTIBODY: CPT | Mod: 91

## 2024-11-04 PROCEDURE — 82043 UR ALBUMIN QUANTITATIVE: CPT

## 2024-11-04 PROCEDURE — 80053 COMPREHEN METABOLIC PANEL: CPT

## 2024-11-04 PROCEDURE — 84443 ASSAY THYROID STIM HORMONE: CPT

## 2024-11-04 PROCEDURE — 82570 ASSAY OF URINE CREATININE: CPT

## 2024-11-04 PROCEDURE — 84439 ASSAY OF FREE THYROXINE: CPT

## 2024-11-06 LAB — C PEPTIDE SERPL-MCNC: 0.3 NG/ML (ref 0.5–3.3)

## 2024-11-07 LAB
INSULIN HUMAN AB SER-ACNC: <0.4 U/ML (ref 0–0.4)
ISLET CELL512 AB SER IA-ACNC: <5.4 U/ML (ref 0–7.4)

## 2024-11-09 LAB — GAD65 AB SER IA-ACNC: <5 IU/ML (ref 0–5)

## 2024-11-16 LAB — ZNT8 AB SERPL IA-ACNC: <10 U/ML (ref 0–15)

## 2024-11-20 ENCOUNTER — OFFICE VISIT (OUTPATIENT)
Dept: MEDICAL GROUP | Facility: PHYSICIAN GROUP | Age: 54
End: 2024-11-20
Payer: COMMERCIAL

## 2024-11-20 ENCOUNTER — PATIENT MESSAGE (OUTPATIENT)
Dept: MEDICAL GROUP | Facility: PHYSICIAN GROUP | Age: 54
End: 2024-11-20

## 2024-11-20 VITALS
BODY MASS INDEX: 41.99 KG/M2 | OXYGEN SATURATION: 96 % | HEIGHT: 65 IN | TEMPERATURE: 98.7 F | DIASTOLIC BLOOD PRESSURE: 80 MMHG | SYSTOLIC BLOOD PRESSURE: 138 MMHG | HEART RATE: 76 BPM | WEIGHT: 252 LBS

## 2024-11-20 DIAGNOSIS — M79.602 PARESTHESIA AND PAIN OF BOTH UPPER EXTREMITIES: ICD-10-CM

## 2024-11-20 DIAGNOSIS — E34.9 NEUROPATHY ASSOCIATED WITH ENDOCRINE DISORDER (HCC): ICD-10-CM

## 2024-11-20 DIAGNOSIS — G63 NEUROPATHY ASSOCIATED WITH ENDOCRINE DISORDER (HCC): ICD-10-CM

## 2024-11-20 DIAGNOSIS — R20.2 PARESTHESIA AND PAIN OF BOTH UPPER EXTREMITIES: ICD-10-CM

## 2024-11-20 DIAGNOSIS — M79.601 PARESTHESIA AND PAIN OF BOTH UPPER EXTREMITIES: ICD-10-CM

## 2024-11-20 DIAGNOSIS — E13.9 DIABETES MELLITUS TYPE 1.5, MANAGED AS TYPE 1 (HCC): ICD-10-CM

## 2024-11-20 PROCEDURE — 7101 PR PHYSICAL: Performed by: FAMILY MEDICINE

## 2024-11-20 PROCEDURE — 3079F DIAST BP 80-89 MM HG: CPT | Performed by: FAMILY MEDICINE

## 2024-11-20 PROCEDURE — 3075F SYST BP GE 130 - 139MM HG: CPT | Performed by: FAMILY MEDICINE

## 2024-11-20 PROCEDURE — 99214 OFFICE O/P EST MOD 30 MIN: CPT | Performed by: FAMILY MEDICINE

## 2024-11-20 RX ORDER — PREDNISONE 20 MG/1
20 TABLET ORAL DAILY
Qty: 5 TABLET | Refills: 1 | Status: SHIPPED | OUTPATIENT
Start: 2024-11-20

## 2024-11-20 RX ORDER — KETOROLAC TROMETHAMINE 15 MG/ML
15 INJECTION, SOLUTION INTRAMUSCULAR; INTRAVENOUS ONCE
Status: COMPLETED | OUTPATIENT
Start: 2024-11-20 | End: 2024-11-20

## 2024-11-20 RX ORDER — INSULIN DEGLUDEC 200 U/ML
INJECTION, SOLUTION SUBCUTANEOUS
COMMUNITY
Start: 2024-11-15

## 2024-11-20 RX ORDER — PREGABALIN 100 MG/1
100 CAPSULE ORAL 2 TIMES DAILY
Qty: 180 CAPSULE | Refills: 0 | Status: SHIPPED | OUTPATIENT
Start: 2024-11-20 | End: 2025-02-18

## 2024-11-20 RX ADMIN — KETOROLAC TROMETHAMINE 15 MG: 15 INJECTION, SOLUTION INTRAMUSCULAR; INTRAVENOUS at 07:37

## 2024-11-20 RX ADMIN — KETOROLAC TROMETHAMINE 15 MG: 15 INJECTION, SOLUTION INTRAMUSCULAR; INTRAVENOUS at 07:57

## 2024-11-20 RX ADMIN — KETOROLAC TROMETHAMINE 15 MG: 15 INJECTION, SOLUTION INTRAMUSCULAR; INTRAVENOUS at 07:36

## 2024-11-20 ASSESSMENT — FIBROSIS 4 INDEX: FIB4 SCORE: 0.87

## 2024-11-20 NOTE — PROGRESS NOTES
"*NATALIIA unavailable.     Subjective:     HPI:   History of Present Illness  Patient presents to clinic today to follow-up on starting her Lyrica and because she is having a pain flare.  She notes for the past several days her nerve pain has been significantly worse despite starting the Lyrica and initially noticing an improvement in her baseline and overall nerve pain.  She states she has had a few side effects on the Lyrica a little bit of drowsiness/fatigue and brain fog but otherwise is doing well.  She was able to see endocrinology and has been able to get a continuous glucose monitor and has been wearing it for the past week.  She still has highs but has been able to spend at least 59% of her time in her normal range.  She notes that she would like to have [employer] paperwork for her job filled out today as well as [employer] paperwork so that she can use their sick leave time being as well.        Objective:     Exam:  /80 (BP Location: Right arm, Patient Position: Sitting, BP Cuff Size: Adult)   Pulse 76   Temp 37.1 °C (98.7 °F) (Temporal)   Ht 1.651 m (5' 5\")   Wt 114 kg (252 lb)   SpO2 96%   BMI 41.93 kg/m²  Body mass index is 41.93 kg/m².    Physical Exam  Constitutional:       Appearance: Normal appearance.      Comments: Moderately uncomfortable   HENT:      Head: Normocephalic and atraumatic.      Nose: Nose normal.      Mouth/Throat:      Mouth: Mucous membranes are moist.   Eyes:      Extraocular Movements: Extraocular movements intact.      Conjunctiva/sclera: Conjunctivae normal.      Pupils: Pupils are equal, round, and reactive to light.   Cardiovascular:      Rate and Rhythm: Normal rate and regular rhythm.      Heart sounds: No murmur heard.  Pulmonary:      Effort: Pulmonary effort is normal.      Breath sounds: Normal breath sounds.   Abdominal:      General: Abdomen is flat. Bowel sounds are normal.   Skin:     General: Skin is warm and dry.   Neurological:      General: No focal deficit " present.      Mental Status: She is alert and oriented to person, place, and time.   Psychiatric:         Mood and Affect: Mood normal.         Assessment & Plan:     1. Paresthesia and pain of both upper extremities  pregabalin (LYRICA) 100 MG Cap    predniSONE (DELTASONE) 20 MG Tab    ketorolac (Toradol) 15 MG/ML injection 15 mg    ketorolac (Toradol) 15 MG/ML injection 15 mg    tizanidine (ZANAFLEX) 4 MG Tab      2. Neuropathy associated with endocrine disorder (HCC)  tizanidine (ZANAFLEX) 4 MG Tab      3. Diabetes mellitus type 1.5, managed as type 1 ([facility])            Assessment & Plan  Paresthesias and neuropathy  Chronic.  Though having an acute flare.  Had slightly improved since starting the Lyrica so we will increase Lyrica from 75 mg twice daily to 100 mg twice a day.  Controlled substance agreement on file.   reviewed today.  UDS up-to-date.  Will also give patient 30 mg of Toradol today, discussed risks of using additional NSAIDs in the next 72 hours so patient will also take as 1000 mg of Tylenol every 6 hours as needed.  Will send in a prescription for tizanidine and discussed possible side effects.  Given that her diabetes is not well-controlled at this time will send in a prescription for low-dose prednisone for her to use if none of the other medications or medication dosage changes help.    2.  Diabetes  Chronic.  Uncontrolled.  Do have to monitor her blood sugar and relations to treating her pain.  Will fill out [facility] paperwork today.  Patient is to continue Lantus 60 to 64 units every evening as well as insulin lispro 24 units 3 times a day.  Patient is to follow-up with her endocrinologist as instructed.            I spent a total of 33 minutes with record review, exam, communication with the patient, communication with other providers, and documentation of this encounter.    Return in about 7 weeks (around 1/8/2025) for Med F/U.    Please note that this dictation was created using voice  recognition software. I have made every reasonable attempt to correct obvious errors, but I expect that there are errors of grammar and possibly content that I did not discover before finalizing the note.      I personally reviewed and updated and reviewed the patient's personal, social, family and surgical history at today's appointment.

## 2025-01-17 ENCOUNTER — HOSPITAL ENCOUNTER (OUTPATIENT)
Dept: RADIOLOGY | Facility: MEDICAL CENTER | Age: 55
End: 2025-01-17
Attending: PAIN MEDICINE
Payer: COMMERCIAL

## 2025-01-17 DIAGNOSIS — M54.14 THORACIC NEURITIS: ICD-10-CM

## 2025-01-17 PROCEDURE — 72146 MRI CHEST SPINE W/O DYE: CPT

## 2025-03-12 ENCOUNTER — APPOINTMENT (OUTPATIENT)
Dept: MEDICAL GROUP | Facility: PHYSICIAN GROUP | Age: 55
End: 2025-03-12
Payer: COMMERCIAL

## 2025-03-12 ENCOUNTER — TELEPHONE (OUTPATIENT)
Dept: MEDICAL GROUP | Facility: PHYSICIAN GROUP | Age: 55
End: 2025-03-12

## 2025-03-12 NOTE — TELEPHONE ENCOUNTER
Phone Number Called: .[email or web address]      Call outcome: Left detailed message for patient. Informed to call back with any additional questions.    Message: PT called to reschedule since Dr. Barrios is out. PT told to call back at

## 2025-03-14 ENCOUNTER — APPOINTMENT (OUTPATIENT)
Dept: MEDICAL GROUP | Facility: PHYSICIAN GROUP | Age: 55
End: 2025-03-14
Payer: COMMERCIAL

## 2025-03-18 ENCOUNTER — APPOINTMENT (OUTPATIENT)
Dept: MEDICAL GROUP | Facility: PHYSICIAN GROUP | Age: 55
End: 2025-03-18
Payer: COMMERCIAL

## 2025-03-27 ENCOUNTER — APPOINTMENT (OUTPATIENT)
Dept: MEDICAL GROUP | Facility: PHYSICIAN GROUP | Age: 55
End: 2025-03-27
Payer: COMMERCIAL

## 2025-06-11 ENCOUNTER — APPOINTMENT (OUTPATIENT)
Dept: MEDICAL GROUP | Facility: PHYSICIAN GROUP | Age: 55
End: 2025-06-11
Payer: COMMERCIAL

## 2025-06-11 VITALS
SYSTOLIC BLOOD PRESSURE: 140 MMHG | TEMPERATURE: 98.1 F | DIASTOLIC BLOOD PRESSURE: 80 MMHG | HEIGHT: 65 IN | BODY MASS INDEX: 41.65 KG/M2 | WEIGHT: 250 LBS | HEART RATE: 96 BPM | OXYGEN SATURATION: 94 %

## 2025-06-11 DIAGNOSIS — Z23 NEED FOR VACCINATION: ICD-10-CM

## 2025-06-11 DIAGNOSIS — E78.5 DYSLIPIDEMIA: ICD-10-CM

## 2025-06-11 DIAGNOSIS — E13.9 DIABETES MELLITUS TYPE 1.5, MANAGED AS TYPE 1 (HCC): ICD-10-CM

## 2025-06-11 DIAGNOSIS — M25.50 POLYARTHRALGIA: ICD-10-CM

## 2025-06-11 DIAGNOSIS — R03.0 ELEVATED BLOOD PRESSURE READING IN OFFICE WITHOUT DIAGNOSIS OF HYPERTENSION: ICD-10-CM

## 2025-06-11 DIAGNOSIS — G63 NEUROPATHY ASSOCIATED WITH ENDOCRINE DISORDER (HCC): Primary | ICD-10-CM

## 2025-06-11 DIAGNOSIS — E34.9 NEUROPATHY ASSOCIATED WITH ENDOCRINE DISORDER (HCC): Primary | ICD-10-CM

## 2025-06-11 DIAGNOSIS — E13.3211: ICD-10-CM

## 2025-06-11 PROCEDURE — 90746 HEPB VACCINE 3 DOSE ADULT IM: CPT | Mod: JZ | Performed by: FAMILY MEDICINE

## 2025-06-11 PROCEDURE — 3077F SYST BP >= 140 MM HG: CPT | Performed by: FAMILY MEDICINE

## 2025-06-11 PROCEDURE — 92250 FUNDUS PHOTOGRAPHY W/I&R: CPT | Mod: 26 | Performed by: FAMILY MEDICINE

## 2025-06-11 PROCEDURE — 90471 IMMUNIZATION ADMIN: CPT | Performed by: FAMILY MEDICINE

## 2025-06-11 PROCEDURE — 90472 IMMUNIZATION ADMIN EACH ADD: CPT | Performed by: FAMILY MEDICINE

## 2025-06-11 PROCEDURE — 99214 OFFICE O/P EST MOD 30 MIN: CPT | Mod: 25 | Performed by: FAMILY MEDICINE

## 2025-06-11 PROCEDURE — 90715 TDAP VACCINE 7 YRS/> IM: CPT | Performed by: FAMILY MEDICINE

## 2025-06-11 PROCEDURE — 3079F DIAST BP 80-89 MM HG: CPT | Performed by: FAMILY MEDICINE

## 2025-06-11 RX ORDER — ACETAMINOPHEN 325 MG/1
650 TABLET ORAL EVERY 6 HOURS PRN
COMMUNITY

## 2025-06-11 RX ORDER — ACYCLOVIR 400 MG/1
TABLET ORAL
COMMUNITY

## 2025-06-11 RX ORDER — DIPHENHYDRAMINE HCL 50 MG
50 CAPSULE ORAL
COMMUNITY

## 2025-06-11 RX ORDER — NORTRIPTYLINE HYDROCHLORIDE 50 MG/1
50 CAPSULE ORAL 2 TIMES DAILY PRN
COMMUNITY
Start: 2025-05-23

## 2025-06-11 RX ORDER — DULOXETIN HYDROCHLORIDE 20 MG/1
20 CAPSULE, DELAYED RELEASE ORAL DAILY
Qty: 90 CAPSULE | Refills: 3 | Status: SHIPPED | OUTPATIENT
Start: 2025-06-11

## 2025-06-11 RX ORDER — ATORVASTATIN CALCIUM 20 MG/1
20 TABLET, FILM COATED ORAL NIGHTLY
Qty: 90 TABLET | Refills: 3 | Status: SHIPPED | OUTPATIENT
Start: 2025-06-11

## 2025-06-11 RX ORDER — IBUPROFEN 200 MG
400 TABLET ORAL
COMMUNITY

## 2025-06-11 ASSESSMENT — PATIENT HEALTH QUESTIONNAIRE - PHQ9
SUM OF ALL RESPONSES TO PHQ QUESTIONS 1-9: 19
5. POOR APPETITE OR OVEREATING: 0 - NOT AT ALL
CLINICAL INTERPRETATION OF PHQ2 SCORE: 4

## 2025-06-11 ASSESSMENT — FIBROSIS 4 INDEX: FIB4 SCORE: 0.87

## 2025-06-16 NOTE — PROGRESS NOTES
Verbal consent was acquired by the patient to use [employer] ambient listening note generation during this visit     Subjective:     HPI:   History of Present Illness  The patient presents for evaluation of nerve pain, diabetes mellitus, and elevated blood pressure.    Nerve Pain  - She has been under the care of a pain and spine specialist, who prescribed several medications.  - Nortriptyline was found to significantly alleviate her nerve pain.  - An MRI was conducted in 01/2025.  - Prior to this, she experienced severe pain episodes, rated at 8 or 9 on the pain scale, which have since subsided.  - However, she still experiences pain rated at 7 or 8 for several hours in the evening, 4 to 5 days a week, often leading to tears.  - Her last appointment with the specialist was approximately 3 weeks ago, and she is scheduled for follow-ups every 2 to 3 months.  - She was informed that her current condition is the best that can be achieved with their treatment plan.  - She is seeking additional treatment options from her primary care physician to manage her depression and fatigue, which are exacerbated by her chronic pain.  - She uses edibles for pain management but finds them incapacitating.  - She has not tried Cymbalta.  - She has previously taken Zoloft for situational depression on three separate occasions, with good results and no significant side effects.  - She is currently on a regimen of nortriptyline 100 mg daily, divided into two doses of 50 mg each, taken 12 hours apart.  - This adjustment was made due to worsening pain at the time of her evening dose.  - She has discontinued Lyrica and two other medications, the names of which she cannot recall.    Diabetes Mellitus  - She is currently on Tresiba and uses a continuous glucose monitor.  - Her last endocrinology appointment was 2 months ago, at which time her A1c was 6.2.  - She has found the continuous glucose monitor helpful in managing her blood sugar  "levels, allowing her to adjust her insulin dosage to maintain levels below 180.  - She has noticed that her blood sugar levels tend to rise overnight, regardless of her diet, and can also spike randomly during the day.  - She has found that consuming a spoonful of peanut butter throughout the day helps stabilize her blood sugar levels.  - She has not had a foot exam but has had an eye exam.    Elevated Blood Pressure  - She is unsure if her elevated blood pressure is due to taking extra Tylenol for pain management or if she is developing hypertension.  - She does not monitor her blood pressure at home.    Supplemental information: She is due for her tetanus vaccine as of 05/13/2025. She is also due for her COVID-19 booster. She discontinued Lipitor following a hospital visit in 05/2024, as advised by her endocrinologist, and plans to resume it once her diabetes is better controlled.        Objective:     Exam:  BP (!) 140/80 (BP Location: Right arm, Patient Position: Sitting, BP Cuff Size: Adult long) Comment: pt requested forearm blood pressure  Pulse 96   Temp 36.7 °C (98.1 °F) (Temporal)   Ht 1.651 m (5' 5\")   Wt 113 kg (250 lb)   SpO2 94%   BMI 41.60 kg/m²  Body mass index is 41.6 kg/m².    Physical Exam  Constitutional:       Appearance: Normal appearance.   HENT:      Head: Normocephalic and atraumatic.      Nose: Nose normal.      Mouth/Throat:      Mouth: Mucous membranes are moist.   Eyes:      Extraocular Movements: Extraocular movements intact.      Conjunctiva/sclera: Conjunctivae normal.      Pupils: Pupils are equal, round, and reactive to light.   Cardiovascular:      Rate and Rhythm: Normal rate and regular rhythm.      Pulses:           Dorsalis pedis pulses are 2+ on the right side and 2+ on the left side.   Pulmonary:      Effort: Pulmonary effort is normal.      Breath sounds: Normal breath sounds.   Abdominal:      General: Abdomen is flat. Bowel sounds are normal.   Musculoskeletal:      " Cervical back: Neck supple.   Feet:      Right foot:      Protective Sensation: 3 sites tested.  3 sites sensed.      Skin integrity: Skin integrity normal.      Left foot:      Protective Sensation: 3 sites tested.  3 sites sensed.      Skin integrity: Skin integrity normal.   Skin:     General: Skin is warm and dry.   Neurological:      General: No focal deficit present.      Mental Status: She is alert and oriented to person, place, and time.   Psychiatric:         Mood and Affect: Mood normal.             Results  - Labs:    - A1c: 6.2% (04/2025)    Assessment & Plan:     1. Neuropathy associated with endocrine disorder (HCC)  DULoxetine (CYMBALTA) 20 MG Cap DR Particles      2. Diabetes mellitus type 1.5, managed as type 1 (HCC)  Lipid Profile    POCT Retinal Eye Exam    Diabetic Monofilament LE Exam      3. Dyslipidemia  Lipid Profile    atorvastatin (LIPITOR) 20 MG Tab      4. Polyarthralgia  DULoxetine (CYMBALTA) 20 MG Cap DR Particles      5. 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 Need for vaccination  Hepatitis B Vaccine Adult IM    Tdap =>8yo IM      7. Elevated blood pressure reading in office without diagnosis of hypertension            Assessment & Plan  Nerve pain/Polyarthralgia  - Chronic and uncontrolled  - Symptoms suggest a neuropathic origin  - Significant relief reported from nortriptyline 100 mg daily (50 mg in the morning and 50 mg at night)  - Severe pain (7-8/10) persists four to five days a week  - Initiate Cymbalta at a low dose to manage nerve pain and associated depression  - Discussed potential side effects: nausea, vomiting, worsening anxiety or depression  - Maintain low dose if effective; otherwise, increase dosage    Diabetes mellitus/Retinopathy  - Chronic, stable and well controlled  - Blood glucose levels well-controlled with Tresiba and  continuous glucose monitor  - Last A1c: 6.2% (04/2025)  - Order comprehensive blood work panel to assess current health status  - Continue insulin lispro 24 U TID  - Monofilamint exam done today  - POCT retina exam done today      Elevated blood pressure  - Elevated blood pressure readings during this visit  - May be due to pain  - Recommended patient keep a BP log at home  - Monitor salt intake    Health maintenance  - Due for tetanus vaccine (05/13/2025) and COVID-19 booster  - Will receive tetanus and hepatitis B vaccines today    Cholesterol management  - Chronic  - Discontinued Lipitor following hospital visit (05/2024) as advised by endocrinologist  - Plans to resume Lipitor once diabetes is better controlled  - Provide prescription for Lipitor with instructions to take it at night  - Will order lab work    Follow-up  - Follow up in 6 weeks to evaluate response to Cymbalta          Return in about 6 weeks (around 7/23/2025) for Med F/U.    Please note that this dictation was created using voice recognition software. I have made every reasonable attempt to correct obvious errors, but I expect that there are errors of grammar and possibly content that I did not discover before finalizing the note.        I personally reviewed and updated and reviewed the patient's personal, social, family and surgical history at today's appointment.

## 2025-06-17 ENCOUNTER — RESULTS FOLLOW-UP (OUTPATIENT)
Dept: MEDICAL GROUP | Facility: PHYSICIAN GROUP | Age: 55
End: 2025-06-17

## 2025-06-17 LAB — RETINAL SCREEN: POSITIVE

## 2025-07-21 ENCOUNTER — APPOINTMENT (OUTPATIENT)
Dept: LAB | Facility: MEDICAL CENTER | Age: 55
End: 2025-07-21
Payer: COMMERCIAL

## 2025-07-24 ENCOUNTER — OFFICE VISIT (OUTPATIENT)
Dept: MEDICAL GROUP | Facility: PHYSICIAN GROUP | Age: 55
End: 2025-07-24
Payer: COMMERCIAL

## 2025-07-24 VITALS
SYSTOLIC BLOOD PRESSURE: 132 MMHG | HEART RATE: 100 BPM | RESPIRATION RATE: 16 BRPM | TEMPERATURE: 97.2 F | HEIGHT: 65 IN | DIASTOLIC BLOOD PRESSURE: 78 MMHG | OXYGEN SATURATION: 96 % | BODY MASS INDEX: 41.99 KG/M2 | WEIGHT: 252 LBS

## 2025-07-24 DIAGNOSIS — R20.2 PARESTHESIA AND PAIN OF BOTH UPPER EXTREMITIES: ICD-10-CM

## 2025-07-24 DIAGNOSIS — M79.602 PARESTHESIA AND PAIN OF BOTH UPPER EXTREMITIES: ICD-10-CM

## 2025-07-24 DIAGNOSIS — G63 NEUROPATHY ASSOCIATED WITH ENDOCRINE DISORDER (HCC): Primary | ICD-10-CM

## 2025-07-24 DIAGNOSIS — M79.601 PARESTHESIA AND PAIN OF BOTH UPPER EXTREMITIES: ICD-10-CM

## 2025-07-24 DIAGNOSIS — E34.9 NEUROPATHY ASSOCIATED WITH ENDOCRINE DISORDER (HCC): Primary | ICD-10-CM

## 2025-07-24 DIAGNOSIS — R53.82 CHRONIC FATIGUE: ICD-10-CM

## 2025-07-24 DIAGNOSIS — M65.312 TRIGGER FINGER OF LEFT THUMB: ICD-10-CM

## 2025-07-24 DIAGNOSIS — M25.50 POLYARTHRALGIA: ICD-10-CM

## 2025-07-24 PROCEDURE — 99214 OFFICE O/P EST MOD 30 MIN: CPT | Performed by: FAMILY MEDICINE

## 2025-07-24 PROCEDURE — 3078F DIAST BP <80 MM HG: CPT | Performed by: FAMILY MEDICINE

## 2025-07-24 PROCEDURE — 3075F SYST BP GE 130 - 139MM HG: CPT | Performed by: FAMILY MEDICINE

## 2025-07-24 RX ORDER — NORTRIPTYLINE HYDROCHLORIDE 50 MG/1
100 CAPSULE ORAL DAILY
Qty: 180 CAPSULE | Refills: 3 | Status: SHIPPED | OUTPATIENT
Start: 2025-07-24

## 2025-07-24 RX ORDER — DULOXETIN HYDROCHLORIDE 30 MG/1
30 CAPSULE, DELAYED RELEASE ORAL DAILY
Qty: 90 CAPSULE | Refills: 3 | Status: SHIPPED | OUTPATIENT
Start: 2025-07-24

## 2025-07-24 ASSESSMENT — FIBROSIS 4 INDEX: FIB4 SCORE: 0.89

## 2025-07-24 NOTE — PROGRESS NOTES
Verbal consent was acquired by the patient to use [employer] ambient listening note generation during this visit     Subjective:     HPI:   History of Present Illness  The patient presents for evaluation of pain, trigger thumb, and chronic fatigue.    Pain  - She reports a significant improvement in her condition, with pain levels not exceeding 7 or 8 since starting her current medication regimen.  - This is a notable change as she has not experienced such low pain levels in over 2 years.  - She experiences discomfort from her bra strap, which becomes achy after about an hour of wear. However, the pain does not escalate to the point of causing distress.  - She is currently on nortriptyline 50 mg twice daily, taken every 12 hours. Initially, she was on a once-daily dose, but it would wear off by evening, leading to severe pain at night.  - The dosage was then split into two 50 mg doses to provide coverage throughout the day and night.  - With the addition of Cymbalta, she believes she could return to a once-daily dosing schedule.  - She noticed a significant improvement within the first 3 days of starting Cymbalta, although she did experience dizziness initially. This side effect has since resolved.  - She is seeking a refill of her nortriptyline prescription as she only has a 4-day supply left.    Trigger Thumb  - She is experiencing trigger thumb, which locks and causes pain, accompanied by a clicking sensation.  - She has been using braces for support and is still able to type normally.    Chronic Fatigue  - She continues to struggle with chronic fatigue, often feeling extremely tired and needing to sleep more than usual.  - She is unsure if this is due to her body recovering from previous conditions or if it is related to her nerve issues.  - She still experiences numbness but no longer has stinging or shooting pains, and the dull ache has subsided.    Sleep  - She reports needing to sleep more than usual and  "feeling extremely tired which is not new        Objective:     Exam:  /78 (BP Location: Right arm, Patient Position: Sitting, BP Cuff Size: Large adult)   Pulse 100   Temp 36.2 °C (97.2 °F) (Temporal)   Resp 16   Ht 1.651 m (5' 5\")   Wt 114 kg (252 lb)   SpO2 96%   BMI 41.93 kg/m²  Body mass index is 41.93 kg/m².    Physical Exam  Constitutional:       Appearance: Normal appearance.   HENT:      Head: Normocephalic and atraumatic.      Nose: Nose normal.      Mouth/Throat:      Mouth: Mucous membranes are moist.   Eyes:      Extraocular Movements: Extraocular movements intact.      Conjunctiva/sclera: Conjunctivae normal.      Pupils: Pupils are equal, round, and reactive to light.   Cardiovascular:      Rate and Rhythm: Normal rate and regular rhythm.   Pulmonary:      Effort: Pulmonary effort is normal.      Breath sounds: Normal breath sounds.   Abdominal:      General: Abdomen is flat. Bowel sounds are normal.      Palpations: Abdomen is soft.   Musculoskeletal:      Left hand: Decreased range of motion (Thumb).      Cervical back: Neck supple.   Skin:     General: Skin is warm and dry.   Neurological:      General: No focal deficit present.      Mental Status: She is alert and oriented to person, place, and time.   Psychiatric:         Mood and Affect: Mood normal.             Results      Assessment & Plan:     1. Neuropathy associated with endocrine disorder (HCC)  nortriptyline (PAMELOR) 50 MG capsule    DULoxetine (CYMBALTA) 30 MG Cap DR Particles      2. Polyarthralgia  nortriptyline (PAMELOR) 50 MG capsule    DULoxetine (CYMBALTA) 30 MG Cap DR Particles      3. Trigger finger of left thumb  Referral to Orthopedics      4. Paresthesia and pain of both upper extremities        5. Chronic fatigue            Assessment & Plan  Pain management-neuropathy, polyarthralgia, paresthesias  - Chronic  - Pain levels have significantly improved with the current medication regimen, including Cymbalta which " was added to her regimen about 6 weeks ago  - Reports that pain has not exceeded a level of 7 or 8 in over two years  - Dosage of Cymbalta will be increased slightly to further manage pain  - Prescription for nortriptyline 100 mg daily has been provided, to be taken in the morning    Trigger thumb-L  - Chronic  - Reports experiencing locking and painful clicking in the thumb,   - Referral to a hand specialist for further evaluation and potential treatment options, including the possibility of injections    Chronic fatigue  - Continues to experience chronic fatigue, potentially related to ongoing recovery and nerve issues  - No significant changes noted in physical exam findings  - Will be monitored over time to assess any changes in energy levels  - Can consider SHYANN in the future as well    Follow-up  - A follow-up visit is scheduled in 3 months            Return in about 3 months (around 10/24/2025) for Med F/U.    Please note that this dictation was created using voice recognition software. I have made every reasonable attempt to correct obvious errors, but I expect that there are errors of grammar and possibly content that I did not discover before finalizing the note.      I personally reviewed and updated  the patient's personal, social, family and surgical history at today's appointment.

## 2025-07-29 NOTE — Clinical Note
REFERRAL APPROVAL NOTICE         Sent on July 29, 2025                   Cami Block  [address]  [address]  [place] [address]                   Dear Ms. Block,    After a careful review of the medical information and benefit coverage, [facility] has processed your referral. See below for additional details.    If applicable, you must be actively enrolled with your insurance for coverage of the authorized service. If you have any questions regarding your coverage, please contact your insurance directly.    REFERRAL INFORMATION   Referral #:  [id number]  Referred-To Provider    Referred-By Provider:  Orthopedic Surgery    MAKSIM Padilla MICHAEL B      [address]  [address]  [place] [address]  [phone] [address]  [address]  [place] [id number] [phone]    Referral Start Date:  07/24/2025  Referral End Date:   07/24/2026             SCHEDULING  If you do not already have an appointment, please call [phone] to make an appointment.     MORE INFORMATION  If you do not already have a [employer] account, sign up at: [email or web address]  You can access your medical information, make appointments, see lab results, billing information, and more.  If you have questions regarding this referral, please contact  the [facility] Referrals department at:             [phone]. Monday - Friday 8:00AM - 5:00PM.     Sincerely,    [facility]